# Patient Record
Sex: FEMALE | Race: BLACK OR AFRICAN AMERICAN | NOT HISPANIC OR LATINO | Employment: FULL TIME | URBAN - METROPOLITAN AREA
[De-identification: names, ages, dates, MRNs, and addresses within clinical notes are randomized per-mention and may not be internally consistent; named-entity substitution may affect disease eponyms.]

---

## 2019-01-17 ENCOUNTER — TELEPHONE (OUTPATIENT)
Dept: NEUROLOGY | Facility: CLINIC | Age: 44
End: 2019-01-17

## 2019-01-17 NOTE — TELEPHONE ENCOUNTER
Returned call to pt who was dx with MS in 2010. Pt scheduled to see Dr. Britton on 2/1/19 at 8AM. Advised pt to have office notes and mri reports faxed to our office prior to her appt, and to bring a copy of her most recent MRI disc to her appt as well. Provided fax number. Pt verbalized understanding.

## 2019-01-17 NOTE — TELEPHONE ENCOUNTER
----- Message from David Muñoz sent at 1/17/2019 11:29 AM CST -----  Pt seeking new neurologist for MS, due to previous provider no longer in practice. Pt requested Dr. Britton, did not see anything available. Pt placed on wait list for Dr. Britton. Pls contact pt if Dr. Valadez has an availability. Pt said her previous Neurologist diagnosed pt w/ MS in 2010. Pt can be reached at [phone].    Thank you,    David Muñoz

## 2019-02-01 ENCOUNTER — TELEPHONE (OUTPATIENT)
Dept: NEUROLOGY | Facility: CLINIC | Age: 44
End: 2019-02-01

## 2019-02-01 NOTE — TELEPHONE ENCOUNTER
----- Message from Angela Diaz RN sent at 2/1/2019  8:47 AM CST -----  Contact: Pt      ----- Message -----  From: Lionel Little  Sent: 1/31/2019   6:00 PM  To: [facility] Staff    Pt would like to be called back regarding rescheduling  with . No further information was given.    Pt can be reached at [phone].    Thank You.

## 2019-02-08 ENCOUNTER — OFFICE VISIT (OUTPATIENT)
Dept: NEUROLOGY | Facility: CLINIC | Age: 44
End: 2019-02-08
Payer: MEDICARE

## 2019-02-08 VITALS
HEIGHT: 66 IN | HEART RATE: 91 BPM | SYSTOLIC BLOOD PRESSURE: 136 MMHG | WEIGHT: 172 LBS | DIASTOLIC BLOOD PRESSURE: 89 MMHG | BODY MASS INDEX: 27.64 KG/M2

## 2019-02-08 DIAGNOSIS — G43.119 INTRACTABLE MIGRAINE WITH AURA WITHOUT STATUS MIGRAINOSUS: Primary | ICD-10-CM

## 2019-02-08 DIAGNOSIS — M79.7 FIBROMYALGIA: ICD-10-CM

## 2019-02-08 PROCEDURE — 99205 OFFICE O/P NEW HI 60 MIN: CPT | Mod: S$PBB,,, | Performed by: PSYCHIATRY & NEUROLOGY

## 2019-02-08 PROCEDURE — 99213 OFFICE O/P EST LOW 20 MIN: CPT | Mod: PBBFAC | Performed by: PSYCHIATRY & NEUROLOGY

## 2019-02-08 PROCEDURE — 99999 PR PBB SHADOW E&M-EST. PATIENT-LVL III: ICD-10-PCS | Mod: PBBFAC,,, | Performed by: PSYCHIATRY & NEUROLOGY

## 2019-02-08 PROCEDURE — 99205 PR OFFICE/OUTPT VISIT, NEW, LEVL V, 60-74 MIN: ICD-10-PCS | Mod: S$PBB,,, | Performed by: PSYCHIATRY & NEUROLOGY

## 2019-02-08 PROCEDURE — 99999 PR PBB SHADOW E&M-EST. PATIENT-LVL III: CPT | Mod: PBBFAC,,, | Performed by: PSYCHIATRY & NEUROLOGY

## 2019-02-08 RX ORDER — LIDOCAINE AND PRILOCAINE 25; 25 MG/G; MG/G
CREAM TOPICAL
COMMUNITY
Start: 2016-08-09 | End: 2021-01-21

## 2019-02-08 RX ORDER — MILNACIPRAN HYDROCHLORIDE 50 MG/1
TABLET, FILM COATED ORAL
COMMUNITY
Start: 2019-02-06 | End: 2020-12-23 | Stop reason: SDUPTHER

## 2019-02-08 RX ORDER — FLUTICASONE FUROATE AND VILANTEROL 100; 25 UG/1; UG/1
POWDER RESPIRATORY (INHALATION)
COMMUNITY
End: 2022-08-05

## 2019-02-08 RX ORDER — GABAPENTIN 600 MG/1
TABLET ORAL
COMMUNITY
Start: 2018-11-02 | End: 2020-12-23

## 2019-02-08 RX ORDER — HYDROCHLOROTHIAZIDE 25 MG/1
TABLET ORAL
COMMUNITY
Start: 2016-08-11 | End: 2023-06-08 | Stop reason: SDUPTHER

## 2019-02-08 RX ORDER — LACOSAMIDE 100 MG/1
TABLET ORAL
COMMUNITY
End: 2020-12-23

## 2019-02-08 RX ORDER — SUMATRIPTAN SUCCINATE 100 MG/1
TABLET ORAL
COMMUNITY
Start: 2016-08-26 | End: 2020-12-23

## 2019-02-08 RX ORDER — ALBUTEROL SULFATE 90 UG/1
AEROSOL, METERED RESPIRATORY (INHALATION)
COMMUNITY
Start: 2016-09-01

## 2019-02-08 RX ORDER — ACETAMINOPHEN AND CODEINE PHOSPHATE 300; 60 MG/1; MG/1
TABLET ORAL
COMMUNITY
End: 2020-12-23

## 2019-02-08 RX ORDER — AMLODIPINE BESYLATE 10 MG/1
TABLET ORAL
COMMUNITY
Start: 2015-06-25 | End: 2020-12-23

## 2019-02-08 RX ORDER — FLUTICASONE FUROATE AND VILANTEROL 100; 25 UG/1; UG/1
POWDER RESPIRATORY (INHALATION)
COMMUNITY
End: 2020-12-23

## 2019-02-08 RX ORDER — CHOLECALCIFEROL (VITAMIN D3) 25 MCG
1000 TABLET ORAL DAILY
COMMUNITY
End: 2020-12-23

## 2019-02-08 RX ORDER — BACLOFEN 10 MG/1
TABLET ORAL
COMMUNITY
Start: 2018-11-02 | End: 2020-12-23

## 2019-02-08 RX ORDER — HYDROCHLOROTHIAZIDE 25 MG/1
TABLET ORAL
COMMUNITY
Start: 2019-02-06 | End: 2020-12-23

## 2019-02-08 RX ORDER — ALFUZOSIN HYDROCHLORIDE 10 MG/1
10 TABLET, EXTENDED RELEASE ORAL
COMMUNITY
Start: 2017-12-22 | End: 2020-12-23

## 2019-02-08 RX ORDER — GABAPENTIN 600 MG/1
TABLET ORAL
COMMUNITY
End: 2020-12-23

## 2019-02-08 RX ORDER — BUPRENORPHINE 5 UG/H
1 PATCH TRANSDERMAL
COMMUNITY
Start: 2016-11-28 | End: 2020-12-23

## 2019-02-08 RX ORDER — BACLOFEN 10 MG/1
TABLET ORAL
COMMUNITY
Start: 2016-10-17 | End: 2022-08-05

## 2019-02-08 RX ORDER — AMLODIPINE BESYLATE 10 MG/1
TABLET ORAL
COMMUNITY
Start: 2019-02-02 | End: 2020-12-23

## 2019-02-08 NOTE — PROGRESS NOTES
"I saw Meka Cespedes as a new patient today for multiple sclerosis evaluation.  She comes in to establish care and is referred by Self, Aaareferral.  She is accompanied by no one.   OSH Records from Jayme Boyer and Dr. Tineo. Highlights documented here. Will be scanned into record later.      History of Present Illness  The patient is a 43 y.o. RH female with chronic fatigue, h/o fibromyalgia, HLD, HTN, asthma, and cervical radiculopathy, who presented 2/8/19 for MS evaluation      Diagnosed 2010 by Dr. Swain and Dr. Tineo. Symptoms - electrical shock from head to feet, then left side of face and body became numb. After numbness onset, then she had pain all over. She was admitted for a little over 2 weeks. She was told that she had cervical "radiculitis" and then told that she had MS prior to leaving. MRI report from that time (3/2010) states that there was a "sollitary 2-3mm [non-enhancing] white matter tract lesion in the right frontal lobe...No other lesions are demonstrated." She had to have significant rehabilitation - "had to learn to walk again". On lyrica, morphine, norco for pain. Not placed on any DMT's at the time. Placed on baclofen for spasms and gabapentin for neuropathic pain. Over time, she weaned herself from the scheduled pain meds. Then she was later seen by another neurologist who told her that she did not have MS. However, once she followed up with Dr. Tineo, he told her that she had CIS.  2014 - She had difficulty walking due to left leg weakness and left arm weakness. Rebif 2014, but she was not given refill  2016 - Told that she was having a relapse and was given IVMP for symptoms of burning sensation down back, ankles swelling, slurred speech, and joint pain. Also started having urinary hesitation and retention. She was seen by urology, started on flomax which she continued for a while then stopped as it was no longer needed.    Currently taking Vit D 1000 units " daily.    Patient also with uncontrolled migraines w/visual aura (blurred, low vision in right eye) + nausea +/- vomiting. Worse in first few days of menstrual cycle.       Review of systems:   Vertigo/Dizziness: yes  Headaches: Yes, see above  Visual Symptoms: no diplopia or eye pain, but eyesight worsening   Bladder Dysfunction: No   Bowel Dysfunction: constipation   Pain: Yes - back, neck, and joint pain. Has fibromyalgia pain.   Skin Breakdown: No open sores/rashes  Cognitive: No   Dysphagia: No   Dysarthria: No   Hand Dysfunction: No  Gait Disturbance: feels unsteady walking but no falls  Falls: No  Spasticity: No   Mood Disorder: Yes - anxiety, worry a lot    Fatigue: Yes - previously on weekly B12 injections   Sleep Disturbance: Yes - insomnia, daytime drowsiness, and restless sleep   Sexual Dysfunction: Not Assessed   Tremors: No       Past Medical History:   Diagnosis Date    Multiple sclerosis        Family History:   Mother -  2/2 sarcoid  Sister - lupus    Social History:  Employer: [employer]  Never smoker, no alcohol, no drugs  , 3 children      Review of patient's allergies indicates:   Allergen Reactions    Penicillins Anaphylaxis and Rash    Clindamycin Hives    Sulfamethoxazole-trimethoprim Hives, Itching and Swelling    Tizanidine        Current Outpatient Medications on File Prior to Visit   Medication Sig Dispense Refill Last Dose    acetaminophen-codeine 300-60mg (TYLENOL #4) 300-60 mg Tab acetaminophen 300 mg-codeine 60 mg tablet   Taking    albuterol (PROVENTIL/VENTOLIN HFA) 90 mcg/actuation inhaler Ventolin HFA 90 mcg/actuation aerosol inhaler   Taking    alfuzosin (UROXATRAL) 10 mg Tb24 Take 10 mg by mouth.   Taking    amLODIPine (NORVASC) 10 MG tablet amlodipine 10 mg tablet   Taking    amLODIPine (NORVASC) 10 MG tablet    Taking    baclofen (LIORESAL) 10 MG tablet baclofen 10 mg tablet   Taking    baclofen (LIORESAL) 10 MG tablet    Taking    buprenorphine 5 mcg/hour  "(BUTRANS) weekly patch Place 1 patch onto the skin.   Taking    fluticasone-vilanterol (BREO) 100-25 mcg/dose diskus inhaler Inhale into the lungs.   Taking    fluticasone-vilanterol (BREO) 100-25 mcg/dose diskus inhaler Breo Ellipta 100 mcg-25 mcg/dose powder for inhalation   Inhale 1 puff every day by inhalation route.   Taking    gabapentin (NEURONTIN) 600 MG tablet gabapentin 600 mg tablet   Take 0.5 tablets 3 times a day by oral route.   Taking    gabapentin (NEURONTIN) 600 MG tablet    Taking    hydroCHLOROthiazide (HYDRODIURIL) 25 MG tablet hydrochlorothiazide 25 mg tablet   Take 1 tablet every day by oral route.   Taking    hydroCHLOROthiazide (HYDRODIURIL) 25 MG tablet    Taking    HYDROCHLOROTHIAZIDE ORAL    Taking    lacosamide (VIMPAT) 100 mg Tab Vimpat 100 mg tablet   Taking    lidocaine-prilocaine (EMLA) cream    Taking    milnacipran (SAVELLA) 50 mg Tab Savella 50 mg tablet   TAKE 1 TABLET A DAY   Taking    SAVELLA 50 mg Tab    Taking    sumatriptan (IMITREX) 100 MG tablet TAKE 1/2 -1 TABLET ON ONSET OF MIGRANE, MAY REPEAT IN 2 HOURS IF NECESSARY. MAX OF 2 TABLETS PER 24 HOURS.   Taking    vitamin D (VITAMIN D3) 1000 units Tab Take 1,000 Units by mouth once daily.   Taking         Physical Exam    Vitals:    02/08/19 0914   Weight: 78 kg (172 lb)   Height: 5' 6" (1.676 m)       In general, the patient is well nourished.    MENTAL STATUS: language is fluent, normal verbal comprehension, memory is intact, attention is normal, patient is alert and oriented x 3, fund of knowlege is appropriate by vocabulary.     CRANIAL NERVE EXAM:  EOMI, PERRLA. There is no internuclear ophthalmoplegia. No facial asymmetry. There is no dysarthria. Uvula is midline, and palate moves symmetrically. Shoulder shrug intact bilaterlly. Tongue protrusion is midline. Hearing is grossly intact.    MOTOR EXAM: Normal bulk throughout. Paratonia in legs. No pronator drift. Strength is  5/5 in all groups in the lower " extremities and upper extremities;    REFLEXES: 2+ and symmetric throughout in all four extremeties; toes are down bilaterally    SENSORY EXAM: Normal to light touch throughout.    COORDINATION: No ataxia    GAIT: Narrow based and stable        IMAGING (personally reviewed):  MRI Brain and C-spine 2018: unremarkable MRI brain and cervical spinal cord, no visible demyelinating lesions. Some multilevel degenerative changes in cervical spine without apparent spinal stenosis.    LABS:  Lab Results   Component Value Date    WBC 11.95 (H) 05/24/2007    HGB 11.0 (L) 05/24/2007    HCT 33.2 (L) 05/24/2007    MCV 85.6 05/24/2007     05/24/2007         Chemistry        Component Value Date/Time     05/24/2007 1327    K 3.2 (L) 05/24/2007 1327     05/24/2007 1327    CO2 21 (L) 05/24/2007 1327    BUN 4 (L) 05/24/2007 1327    CREATININE 0.7 05/24/2007 1327    GLU 53 (L) 05/24/2007 1327        Component Value Date/Time    CALCIUM 9.9 05/24/2007 1327    ALKPHOS 120 05/24/2007 1327    AST 12 05/24/2007 1327    ALT 6 05/24/2007 1327    BILITOT 0.2 05/24/2007 1327            Lab Results   Component Value Date    LABPROT 9.6 05/10/2007    ALBUMIN 3.7 05/24/2007             Diagnosis/Assessment/Plan:       1. MS evaluation  · Assessment: after review of patient history, MRI, and prior records, we discussed that she does not likely have MS. Discussed with patient that her pain symptoms are likely related to her fibromyalgia. Also migraines or age or vascular risk factors could have contributed to the solitary white matter lesion that she had back in 2010. I do not have a plausible explanation for the event itself without further information/records from that time. Patient is attempting to locate her original scans and will have them mailed in to our clinic to see if any further explanation. Advised f/u with her PCP for her fibromyalgia and possibly seeing a rheumatologists to see if there are other options for pain  management.    2. Migraines with visual aura, intractable  - advised to keep a headache diary  - given counseling about proper migraine management, including keeping OTC and prescription pain medications to less than 2-3 times weekly to avoid rebound headaches. Encouraged hydration and rest for remainder of time  - can trial using ibuprofen 600mg bid 2-3 before menstrual cycle.  - will refer to headache clinic for further management      Over 50% of the 80 min appt was spent counseling the patient about diagnoses and symptom management    No f/u needed with me.    Gretchen Britton MD

## 2019-02-09 PROBLEM — M79.7 FIBROMYALGIA: Status: ACTIVE | Noted: 2019-02-09

## 2019-02-09 PROBLEM — J45.909 ASTHMA: Status: ACTIVE | Noted: 2019-02-09

## 2019-02-09 PROBLEM — Z98.51 H/O TUBAL LIGATION: Status: ACTIVE | Noted: 2019-02-09

## 2019-02-09 PROBLEM — G43.119 INTRACTABLE MIGRAINE WITH AURA WITHOUT STATUS MIGRAINOSUS: Status: ACTIVE | Noted: 2019-02-09

## 2019-02-11 ENCOUNTER — TELEPHONE (OUTPATIENT)
Dept: NEUROLOGY | Facility: CLINIC | Age: 44
End: 2019-02-11

## 2019-02-11 NOTE — TELEPHONE ENCOUNTER
----- Message from Jesse Kan sent at 2/11/2019 11:54 AM CST -----  Contact: Patient @ [phone]  Patient requesting a return call from Bina regarding the MRI imagining, pls call

## 2019-02-14 ENCOUNTER — DOCUMENTATION ONLY (OUTPATIENT)
Dept: NEUROLOGY | Facility: CLINIC | Age: 44
End: 2019-02-14

## 2019-02-26 ENCOUNTER — DOCUMENTATION ONLY (OUTPATIENT)
Dept: NEUROLOGY | Facility: CLINIC | Age: 44
End: 2019-02-26

## 2019-06-13 ENCOUNTER — TELEPHONE (OUTPATIENT)
Dept: RHEUMATOLOGY | Facility: CLINIC | Age: 44
End: 2019-06-13

## 2019-06-13 NOTE — TELEPHONE ENCOUNTER
Spoke with the pt and let her know that there was no available appointments on a Friday until October.

## 2019-07-11 ENCOUNTER — PATIENT MESSAGE (OUTPATIENT)
Dept: NEUROLOGY | Facility: CLINIC | Age: 44
End: 2019-07-11

## 2019-09-07 ENCOUNTER — PATIENT MESSAGE (OUTPATIENT)
Dept: NEUROLOGY | Facility: CLINIC | Age: 44
End: 2019-09-07

## 2020-12-18 NOTE — PROGRESS NOTES
"RHEUMATOLOGY CLINIC INITIAL VISIT    Reason for referral:-  Referred for evaluation of Fibromyalgia flare     Chief complaints:- "hurting all over and not feeling good"      HPI:-  Meka Quach a 45 y.o. pleasant female with PMH of fibromyalgia, Asthma, and migraines comes in for an initial visit with above chief complaints.     Patient was diagnosed with MS in  by Dr. Swain and Dr. Tineo.  Went to [facility] for evaluation (Dr. Britton) in 2019 - did not think it was MS (all MS medications were discontinued).  No follow up with neurology since.  Referral to rheumatology for evaluation of fibromyalgia.  Was on Savella and baclofen.      At the time of diagnosis of MS, patient was experiencing electrical shock from head to feet, L side of the face, and diffused body numbness with pain.  MRI brain (2010) - solitary 2-3mm non enhancing white matter tract lesion in the R frontal lobe.  Was on lyrica, morphine, and norco for pain.  Had to "learn to walk again".  Placed on baclofen for muscle spasm and gabapentin for neuropathic pain.  Then diagnosed with CIS and not MS.      Patient was doing well until Dec 14.  She woke up and was unable to lift her head off the pillow.  Patient started to experience burning sensation from the neck downward to the back.  Went to PCP - was diagnosed with fibromyalgia flare and provided with steroid (taper).  Steroid did not alleviate any of the symptoms.     Pain management in the past for joint injection - cervical and lumbar spine.  Had not went for a while   Fhx:  Mother with sarcoidosis.  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      Hx of clots - PE in 2017 (uncertain of the cause). Miscarriages - none  (full term with no complications)     ROS: Denies any rash, photosensitivity, unintentional weight loss, bowel symptoms, N/V, fever/chills, " Sicca symptoms, recent travels/sick contacts, depression, insomnia, hair loss    +mouth ulcers (in the past-  Nothing recent), Raynaud's, brain fogs, photosensitivity (body aches, fatigue, arthralgia - due to the heat.  Require cooling pack to go outside), severe dried eyes (scheduling appt with ophth), SOB (asthma and costochondritis), joint pain (cervical spine - shoulder, UE, LE, hips), urinary retention (following urologist), myalgia, inbsomnia      Review of Systems   Constitutional: Positive for malaise/fatigue. Negative for chills, diaphoresis, fever and weight loss.   HENT: Negative for congestion, ear discharge, ear pain, hearing loss, nosebleeds, sinus pain and tinnitus.    Eyes: Positive for blurred vision and pain. Negative for photophobia, discharge and redness.   Respiratory: Positive for shortness of breath. Negative for cough, hemoptysis, sputum production, wheezing and stridor.    Cardiovascular: Negative for chest pain, palpitations, orthopnea, claudication, leg swelling and PND.   Gastrointestinal: Negative for abdominal pain, constipation, diarrhea, heartburn, nausea and vomiting.   Genitourinary: Positive for urgency. Negative for dysuria, frequency and hematuria.   Musculoskeletal: Positive for back pain, joint pain, myalgias and neck pain.   Skin: Negative for rash.   Neurological: Positive for weakness and headaches. Negative for dizziness, tingling and tremors.   Endo/Heme/Allergies: Does not bruise/bleed easily.   Psychiatric/Behavioral: Negative for depression, hallucinations and suicidal ideas. The patient has insomnia. The patient is not nervous/anxious.        Past Medical History:   Diagnosis Date    Asthma 2/9/2019    Fibromyalgia 2/9/2019    H/O tubal ligation 2/9/2019    Multiple sclerosis        History reviewed. No pertinent surgical history.     Social History     Tobacco Use    Smoking status: Never Smoker    Smokeless tobacco: Never Used   Substance Use Topics    Alcohol  "use: Not on file    Drug use: Not on file       History reviewed. No pertinent family history.    Review of patient's allergies indicates:   Allergen Reactions    Penicillins Anaphylaxis and Rash    Clindamycin Hives    Sulfamethoxazole-trimethoprim Hives, Itching and Swelling    Tizanidine        Vitals:    12/23/20 1046   BP: (!) 149/91   Pulse: 105   Weight: 81.4 kg (179 lb 5.5 oz)   Height: 5' 6" (1.676 m)   PainSc: 10-Worst pain ever       Physical Exam   Constitutional: She is oriented to person, place, and time and well-developed, well-nourished, and in no distress.   HENT:   Head: Normocephalic and atraumatic.   No mouth ulcers  Normal salivary pooling   No signs of alopecia    Eyes: Pupils are equal, round, and reactive to light. Conjunctivae and EOM are normal.   Neck: Normal range of motion. Neck supple.   Increased sensitivity with touch in the nape of the neck to bilateral shoulder    Cardiovascular: Normal rate, regular rhythm and normal heart sounds.   Pulmonary/Chest: Effort normal and breath sounds normal.   Abdominal: Soft. Bowel sounds are normal.   Musculoskeletal: Normal range of motion.      Comments: 18/18 tender fibromyalgia points  Extreme sensitivity to light touch all over   No signs of synovitis     Neurological: She is alert and oriented to person, place, and time. Gait normal. GCS score is 15.   Skin: Skin is warm and dry. No rash noted. No erythema.   No rash    Psychiatric: Mood, memory, affect and judgment normal.   Tangential   Forgetful        Labs:-  Results for TIERRA SIERRA (MRN [id number]) as of 12/18/2020 14:00   Ref. Range 5/24/2007 15:13 5/24/2007 15:22 5/24/2007 15:23 5/24/2007 15:35 7/2/2007 12:28   Fetal Fibronectin Latest Ref Range: Negative  Negative       Chlamydia, Amplified DNA Latest Ref Range: Not detected    Not Detected     N gonorrhoeae, amplified DNA Latest Ref Range: Not detected    Not Detected     Color, UA Latest Ref Range: Yellow     STRAW  "   Appearance, UA Latest Ref Range: Clear     CLEAR    Specific [place], UA Latest Ref Range: 1.003 - 1.030     1.009    pH, UA Latest Ref Range: 4.5 - 8.0     7.5    Protein, UA Latest Ref Range: Negative mg/dl    NEG    Glucose, UA Latest Ref Range: Negative mg/dl    500 (A)    Ketones, UA Latest Ref Range: Negative mg/dl    NEG    Occult Blood UA Latest Ref Range: Negative     NEG    NITRITE UA Latest Ref Range: Negative     NEG    UROBILINOGEN UA Latest Ref Range: 0 - 1 EU/DL    0.2    Bilirubin (UA) Latest Ref Range: Negative     NEG    Leukocytes, UA Latest Ref Range: Negative     NEG    Bacteria, UA Latest Ref Range: None-Occ     RARE    Squam Epithel, UA Latest Units: /hpf    <1    CULTURE, PERIANAL, FOR GROUP A STREP Unknown  Rpt      CULTURE, URINE Unknown    Rpt    STREP B SCREEN, VAGINAL / RECTAL Unknown     Rpt     Radiographs:-  Independent visualization of images done.    Old and Outside medical records :-  Reviewed old and all outside medical records available in Care Everywhere.  May 2011 - negative MS panel    CSF - low protein (14). Glucose (63), Albumin 8.48. Oligoclonal bands - 0    ACE - 49   Lyme - negative   IgG - 1,135  June 2017 - CTA - L pulmonary artery suspicious for PE. nonobstructing L kidney stone   RUE - negative for DVT  Medication List with Changes/Refills   Current Medications    ALBUTEROL (PROVENTIL/VENTOLIN HFA) 90 MCG/ACTUATION INHALER    Ventolin HFA 90 mcg/actuation aerosol inhaler    BACLOFEN (LIORESAL) 10 MG TABLET    baclofen 10 mg tablet    FLUTICASONE-VILANTEROL (BREO) 100-25 MCG/DOSE DISKUS INHALER    Inhale into the lungs.    HYDROCHLOROTHIAZIDE (HYDRODIURIL) 25 MG TABLET    hydrochlorothiazide 25 mg tablet   Take 1 tablet every day by oral route.    LIDOCAINE-PRILOCAINE (EMLA) CREAM       Changed and/or Refilled Medications    Modified Medication Previous Medication    SAVELLA 50 MG TAB SAVELLA 50 mg Tab       Take 1 tablet (50 mg total) by mouth 2 (two) times daily.    "    Discontinued Medications    ACETAMINOPHEN-CODEINE 300-60MG (TYLENOL #4) 300-60 MG TAB    acetaminophen 300 mg-codeine 60 mg tablet    ALFUZOSIN (UROXATRAL) 10 MG TB24    Take 10 mg by mouth.    AMLODIPINE (NORVASC) 10 MG TABLET    amlodipine 10 mg tablet    AMLODIPINE (NORVASC) 10 MG TABLET        BACLOFEN (LIORESAL) 10 MG TABLET        BUPRENORPHINE 5 MCG/HOUR (BUTRANS) WEEKLY PATCH    Place 1 patch onto the skin.    FLUTICASONE-VILANTEROL (BREO) 100-25 MCG/DOSE DISKUS INHALER    Breo Ellipta 100 mcg-25 mcg/dose powder for inhalation   Inhale 1 puff every day by inhalation route.    GABAPENTIN (NEURONTIN) 600 MG TABLET    gabapentin 600 mg tablet   Take 0.5 tablets 3 times a day by oral route.    GABAPENTIN (NEURONTIN) 600 MG TABLET        HYDROCHLOROTHIAZIDE (HYDRODIURIL) 25 MG TABLET        HYDROCHLOROTHIAZIDE ORAL        LACOSAMIDE (VIMPAT) 100 MG TAB    Vimpat 100 mg tablet    MILNACIPRAN (SAVELLA) 50 MG TAB    Savella 50 mg tablet   TAKE 1 TABLET A DAY    SUMATRIPTAN (IMITREX) 100 MG TABLET    TAKE 1/2 -1 TABLET ON ONSET OF MIGRANE, MAY REPEAT IN 2 HOURS IF NECESSARY. MAX OF 2 TABLETS PER 24 HOURS.    VITAMIN D (VITAMIN D3) 1000 UNITS TAB    Take 1,000 Units by mouth once daily.       Assessment/Plans:-  45 y.o. pleasant female with PMH of fibromyalgia, Asthma, and migraines comes in for an initial visit with concern about fibromyalgia flare.     Patient was previously diagnosed with MS and then said it wasn't.  Patient had been off of MS medications for over 8 years.     Chronic shooting pain from the neck down to the back.  Diffused arthralgia - "hurting everywhere".      Patient has an episode of severe "shooting pain" from neck downward that started on Dec 14.  Since then, patient had not been feeling well.  Was prescribed steroid tapering - which did not help with symptoms.  Labs were done - unavailable to me at this time.  Was told inflammatory makers were elevated?    PE: suggestive of fibromyalgia. "  No signs of synovitis.    Diffused arthralgia - concern about fibromyalgia flare.  Patient had been having increased stressors recently.  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    Plan   - discontinue usage of steroid - no benefit and patient having insomnia  - recommendation to increase savella to 50mg BID   - recommendation for low intensity exercising daily   - de stress.  No work during the holidays (on the computer)  - due to fhx of autoimmune disease - will check SASHA, C3, C4, dsDNA, UA, Up/c, ESR, CRP  - CPK, aldolase  - ANCA, mpo, pr3  - TSH, fT4  - Vitamin B12 and folate  - Arthritis survey   - Vit D  - PreDMARDs panel   - obtain records from PCP - including recent notes, labs, and imaging  - education provided on the management of fibromyalgia       Time spent: 60 minutes in face to face discussion concerning diagnosis, prognosis, review of lab and test results, benefits of treatment as well as management of disease, counseling of patient and coordination of care between various health care providers.  Greater than half the time spent was used for coordination of care and counseling of patient.    Follow up in about 4 weeks (around 1/20/2021).    Thank you for allowing me to participate in the care ofMeka Cespedes.    Disclaimer: This note was prepared using voice recognition system and is likely to have sound alike errors and is not proof read.  Please call me with any questions.

## 2020-12-22 ENCOUNTER — TELEPHONE (OUTPATIENT)
Dept: RHEUMATOLOGY | Facility: CLINIC | Age: 45
End: 2020-12-22

## 2020-12-22 NOTE — TELEPHONE ENCOUNTER
----- Message from Shana Hand RN sent at 12/21/2020  5:11 PM CST -----  Regarding: FW: call back  Contact: [phone]  [facility]  ----- Message -----  From: Kassandra Almonte  Sent: 12/21/2020   4:18 PM CST  To: Rex Correa Staff  Subject: call back                                        Type:  Patient Call Back    Who Called:pt  What this is regarding?: make sure everything is prepared before appt   Would the patient rather a call back or a response via [facility]?call back  Best Call Back Number:[phone]  Additional Information:     Advised to call back directly if there are further questions, or if these symptoms fail to improve as anticipated or worsen.

## 2020-12-23 ENCOUNTER — OFFICE VISIT (OUTPATIENT)
Dept: RHEUMATOLOGY | Facility: CLINIC | Age: 45
End: 2020-12-23
Payer: MEDICARE

## 2020-12-23 ENCOUNTER — HOSPITAL ENCOUNTER (OUTPATIENT)
Dept: RADIOLOGY | Facility: HOSPITAL | Age: 45
Discharge: HOME | End: 2020-12-23
Attending: STUDENT IN AN ORGANIZED HEALTH CARE EDUCATION/TRAINING PROGRAM
Payer: MEDICARE

## 2020-12-23 VITALS
HEIGHT: 66 IN | WEIGHT: 179.38 LBS | HEART RATE: 105 BPM | BODY MASS INDEX: 28.83 KG/M2 | DIASTOLIC BLOOD PRESSURE: 91 MMHG | SYSTOLIC BLOOD PRESSURE: 149 MMHG

## 2020-12-23 DIAGNOSIS — M54.2 CERVICALGIA: ICD-10-CM

## 2020-12-23 DIAGNOSIS — M25.50 ARTHRALGIA, UNSPECIFIED JOINT: Primary | ICD-10-CM

## 2020-12-23 DIAGNOSIS — R20.3 HYPERESTHESIA: ICD-10-CM

## 2020-12-23 DIAGNOSIS — M79.7 FIBROMYALGIA: ICD-10-CM

## 2020-12-23 DIAGNOSIS — M25.50 ARTHRALGIA, UNSPECIFIED JOINT: ICD-10-CM

## 2020-12-23 DIAGNOSIS — G62.9 NEUROPATHY: ICD-10-CM

## 2020-12-23 PROCEDURE — 99205 OFFICE O/P NEW HI 60 MIN: CPT | Mod: S$GLB,,, | Performed by: STUDENT IN AN ORGANIZED HEALTH CARE EDUCATION/TRAINING PROGRAM

## 2020-12-23 PROCEDURE — 77077 JOINT SURVEY SINGLE VIEW: CPT | Mod: 26,,, | Performed by: RADIOLOGY

## 2020-12-23 PROCEDURE — 1125F PR PAIN SEVERITY QUANTIFIED, PAIN PRESENT: ICD-10-PCS | Mod: S$GLB,,, | Performed by: STUDENT IN AN ORGANIZED HEALTH CARE EDUCATION/TRAINING PROGRAM

## 2020-12-23 PROCEDURE — 77077 JOINT SURVEY SINGLE VIEW: CPT | Mod: TC

## 2020-12-23 PROCEDURE — 1125F AMNT PAIN NOTED PAIN PRSNT: CPT | Mod: S$GLB,,, | Performed by: STUDENT IN AN ORGANIZED HEALTH CARE EDUCATION/TRAINING PROGRAM

## 2020-12-23 PROCEDURE — 99999 PR PBB SHADOW E&M-EST. PATIENT-LVL III: ICD-10-PCS | Mod: PBBFAC,,, | Performed by: STUDENT IN AN ORGANIZED HEALTH CARE EDUCATION/TRAINING PROGRAM

## 2020-12-23 PROCEDURE — 77077 XR ARTHRITIS SURVEY: ICD-10-PCS | Mod: 26,,, | Performed by: RADIOLOGY

## 2020-12-23 PROCEDURE — 99999 PR PBB SHADOW E&M-EST. PATIENT-LVL III: CPT | Mod: PBBFAC,,, | Performed by: STUDENT IN AN ORGANIZED HEALTH CARE EDUCATION/TRAINING PROGRAM

## 2020-12-23 PROCEDURE — 3008F BODY MASS INDEX DOCD: CPT | Mod: CPTII,S$GLB,, | Performed by: STUDENT IN AN ORGANIZED HEALTH CARE EDUCATION/TRAINING PROGRAM

## 2020-12-23 PROCEDURE — 3008F PR BODY MASS INDEX (BMI) DOCUMENTED: ICD-10-PCS | Mod: CPTII,S$GLB,, | Performed by: STUDENT IN AN ORGANIZED HEALTH CARE EDUCATION/TRAINING PROGRAM

## 2020-12-23 PROCEDURE — 99205 PR OFFICE/OUTPT VISIT, NEW, LEVL V, 60-74 MIN: ICD-10-PCS | Mod: S$GLB,,, | Performed by: STUDENT IN AN ORGANIZED HEALTH CARE EDUCATION/TRAINING PROGRAM

## 2020-12-23 RX ORDER — MILNACIPRAN HYDROCHLORIDE 50 MG/1
50 TABLET, FILM COATED ORAL 2 TIMES DAILY
Qty: 60 TABLET | Refills: 0 | Status: SHIPPED | OUTPATIENT
Start: 2020-12-23 | End: 2021-01-21 | Stop reason: SDUPTHER

## 2020-12-23 NOTE — TELEPHONE ENCOUNTER
----- Message from Dunia Lewis sent at 2020 10:17 AM CST -----  Regardin mins late  Type: Needs Medical Advice  Who Called:  pt  Symptoms (please be specific):    How long has patient had these symptoms:    Pharmacy name and phone #:    Best Call Back Number: [phone] (home)     Additional Information: pt 20 late for appt thanks

## 2020-12-24 ENCOUNTER — TELEPHONE (OUTPATIENT)
Dept: RHEUMATOLOGY | Facility: CLINIC | Age: 45
End: 2020-12-24

## 2020-12-24 DIAGNOSIS — E55.9 VITAMIN D INSUFFICIENCY: Primary | ICD-10-CM

## 2020-12-24 RX ORDER — ERGOCALCIFEROL 1.25 MG/1
50000 CAPSULE ORAL
Qty: 4 CAPSULE | Refills: 11 | Status: SHIPPED | OUTPATIENT
Start: 2020-12-24 | End: 2021-01-21

## 2021-01-21 ENCOUNTER — OFFICE VISIT (OUTPATIENT)
Dept: RHEUMATOLOGY | Facility: CLINIC | Age: 46
End: 2021-01-21
Payer: MEDICARE

## 2021-01-21 VITALS
HEART RATE: 81 BPM | DIASTOLIC BLOOD PRESSURE: 87 MMHG | WEIGHT: 181.19 LBS | BODY MASS INDEX: 29.12 KG/M2 | SYSTOLIC BLOOD PRESSURE: 133 MMHG | HEIGHT: 66 IN

## 2021-01-21 DIAGNOSIS — M25.50 ARTHRALGIA, UNSPECIFIED JOINT: ICD-10-CM

## 2021-01-21 DIAGNOSIS — M79.7 FIBROMYALGIA: ICD-10-CM

## 2021-01-21 DIAGNOSIS — E55.9 VITAMIN D INSUFFICIENCY: Primary | ICD-10-CM

## 2021-01-21 DIAGNOSIS — J45.909 ASTHMA, UNSPECIFIED ASTHMA SEVERITY, UNSPECIFIED WHETHER COMPLICATED, UNSPECIFIED WHETHER PERSISTENT: ICD-10-CM

## 2021-01-21 PROCEDURE — 99214 OFFICE O/P EST MOD 30 MIN: CPT | Mod: S$GLB,,, | Performed by: STUDENT IN AN ORGANIZED HEALTH CARE EDUCATION/TRAINING PROGRAM

## 2021-01-21 PROCEDURE — 3008F BODY MASS INDEX DOCD: CPT | Mod: CPTII,S$GLB,, | Performed by: STUDENT IN AN ORGANIZED HEALTH CARE EDUCATION/TRAINING PROGRAM

## 2021-01-21 PROCEDURE — 99214 PR OFFICE/OUTPT VISIT, EST, LEVL IV, 30-39 MIN: ICD-10-PCS | Mod: S$GLB,,, | Performed by: STUDENT IN AN ORGANIZED HEALTH CARE EDUCATION/TRAINING PROGRAM

## 2021-01-21 PROCEDURE — 1125F PR PAIN SEVERITY QUANTIFIED, PAIN PRESENT: ICD-10-PCS | Mod: S$GLB,,, | Performed by: STUDENT IN AN ORGANIZED HEALTH CARE EDUCATION/TRAINING PROGRAM

## 2021-01-21 PROCEDURE — 1125F AMNT PAIN NOTED PAIN PRSNT: CPT | Mod: S$GLB,,, | Performed by: STUDENT IN AN ORGANIZED HEALTH CARE EDUCATION/TRAINING PROGRAM

## 2021-01-21 PROCEDURE — 99999 PR PBB SHADOW E&M-EST. PATIENT-LVL III: CPT | Mod: PBBFAC,,, | Performed by: STUDENT IN AN ORGANIZED HEALTH CARE EDUCATION/TRAINING PROGRAM

## 2021-01-21 PROCEDURE — 3008F PR BODY MASS INDEX (BMI) DOCUMENTED: ICD-10-PCS | Mod: CPTII,S$GLB,, | Performed by: STUDENT IN AN ORGANIZED HEALTH CARE EDUCATION/TRAINING PROGRAM

## 2021-01-21 PROCEDURE — 99999 PR PBB SHADOW E&M-EST. PATIENT-LVL III: ICD-10-PCS | Mod: PBBFAC,,, | Performed by: STUDENT IN AN ORGANIZED HEALTH CARE EDUCATION/TRAINING PROGRAM

## 2021-01-21 RX ORDER — CARVEDILOL 12.5 MG/1
TABLET ORAL
COMMUNITY
Start: 2020-09-21

## 2021-01-21 RX ORDER — MILNACIPRAN HYDROCHLORIDE 50 MG/1
50 TABLET, FILM COATED ORAL 2 TIMES DAILY
Qty: 60 TABLET | Refills: 2 | Status: SHIPPED | OUTPATIENT
Start: 2021-01-21 | End: 2021-03-18 | Stop reason: SDUPTHER

## 2021-01-21 RX ORDER — PREDNISONE 10 MG/1
TABLET ORAL
COMMUNITY
Start: 2021-01-13 | End: 2021-01-21

## 2021-02-05 ENCOUNTER — TELEPHONE (OUTPATIENT)
Dept: RHEUMATOLOGY | Facility: CLINIC | Age: 46
End: 2021-02-05

## 2021-02-09 ENCOUNTER — PATIENT MESSAGE (OUTPATIENT)
Dept: RHEUMATOLOGY | Facility: CLINIC | Age: 46
End: 2021-02-09

## 2021-02-23 ENCOUNTER — PATIENT MESSAGE (OUTPATIENT)
Dept: RHEUMATOLOGY | Facility: CLINIC | Age: 46
End: 2021-02-23

## 2021-03-12 ENCOUNTER — PATIENT MESSAGE (OUTPATIENT)
Dept: RHEUMATOLOGY | Facility: CLINIC | Age: 46
End: 2021-03-12

## 2021-03-16 ENCOUNTER — TELEPHONE (OUTPATIENT)
Dept: RHEUMATOLOGY | Facility: CLINIC | Age: 46
End: 2021-03-16

## 2021-03-18 ENCOUNTER — OFFICE VISIT (OUTPATIENT)
Dept: RHEUMATOLOGY | Facility: CLINIC | Age: 46
End: 2021-03-18
Payer: MEDICARE

## 2021-03-18 VITALS
SYSTOLIC BLOOD PRESSURE: 129 MMHG | HEIGHT: 66 IN | DIASTOLIC BLOOD PRESSURE: 83 MMHG | WEIGHT: 189.25 LBS | BODY MASS INDEX: 30.41 KG/M2 | HEART RATE: 82 BPM

## 2021-03-18 DIAGNOSIS — M25.50 ARTHRALGIA, UNSPECIFIED JOINT: ICD-10-CM

## 2021-03-18 DIAGNOSIS — M79.7 FIBROMYALGIA: ICD-10-CM

## 2021-03-18 DIAGNOSIS — E55.9 VITAMIN D INSUFFICIENCY: Primary | ICD-10-CM

## 2021-03-18 PROCEDURE — 99214 OFFICE O/P EST MOD 30 MIN: CPT | Mod: S$GLB,,, | Performed by: STUDENT IN AN ORGANIZED HEALTH CARE EDUCATION/TRAINING PROGRAM

## 2021-03-18 PROCEDURE — 1126F AMNT PAIN NOTED NONE PRSNT: CPT | Mod: S$GLB,,, | Performed by: STUDENT IN AN ORGANIZED HEALTH CARE EDUCATION/TRAINING PROGRAM

## 2021-03-18 PROCEDURE — 3008F PR BODY MASS INDEX (BMI) DOCUMENTED: ICD-10-PCS | Mod: CPTII,S$GLB,, | Performed by: STUDENT IN AN ORGANIZED HEALTH CARE EDUCATION/TRAINING PROGRAM

## 2021-03-18 PROCEDURE — 99999 PR PBB SHADOW E&M-EST. PATIENT-LVL III: ICD-10-PCS | Mod: PBBFAC,,, | Performed by: STUDENT IN AN ORGANIZED HEALTH CARE EDUCATION/TRAINING PROGRAM

## 2021-03-18 PROCEDURE — 1126F PR PAIN SEVERITY QUANTIFIED, NO PAIN PRESENT: ICD-10-PCS | Mod: S$GLB,,, | Performed by: STUDENT IN AN ORGANIZED HEALTH CARE EDUCATION/TRAINING PROGRAM

## 2021-03-18 PROCEDURE — 3008F BODY MASS INDEX DOCD: CPT | Mod: CPTII,S$GLB,, | Performed by: STUDENT IN AN ORGANIZED HEALTH CARE EDUCATION/TRAINING PROGRAM

## 2021-03-18 PROCEDURE — 99214 PR OFFICE/OUTPT VISIT, EST, LEVL IV, 30-39 MIN: ICD-10-PCS | Mod: S$GLB,,, | Performed by: STUDENT IN AN ORGANIZED HEALTH CARE EDUCATION/TRAINING PROGRAM

## 2021-03-18 PROCEDURE — 99999 PR PBB SHADOW E&M-EST. PATIENT-LVL III: CPT | Mod: PBBFAC,,, | Performed by: STUDENT IN AN ORGANIZED HEALTH CARE EDUCATION/TRAINING PROGRAM

## 2021-03-18 RX ORDER — MILNACIPRAN HYDROCHLORIDE 50 MG/1
50 TABLET, FILM COATED ORAL 2 TIMES DAILY
Qty: 60 TABLET | Refills: 2 | Status: SHIPPED | OUTPATIENT
Start: 2021-03-18 | End: 2021-10-27

## 2021-03-18 ASSESSMENT — ROUTINE ASSESSMENT OF PATIENT INDEX DATA (RAPID3)
AM STIFFNESS SCORE: 1, YES
PAIN SCORE: 3.5
PSYCHOLOGICAL DISTRESS SCORE: 1.1
PATIENT GLOBAL ASSESSMENT SCORE: 0
MDHAQ FUNCTION SCORE: 0.4
TOTAL RAPID3 SCORE: 1.61
FATIGUE SCORE: 0

## 2021-05-12 ENCOUNTER — PATIENT MESSAGE (OUTPATIENT)
Dept: RESEARCH | Facility: HOSPITAL | Age: 46
End: 2021-05-12

## 2021-09-29 ENCOUNTER — PATIENT MESSAGE (OUTPATIENT)
Dept: RHEUMATOLOGY | Facility: CLINIC | Age: 46
End: 2021-09-29

## 2021-09-29 ENCOUNTER — TELEPHONE (OUTPATIENT)
Dept: RHEUMATOLOGY | Facility: CLINIC | Age: 46
End: 2021-09-29

## 2021-09-29 RX ORDER — ERGOCALCIFEROL 1.25 MG/1
50000 CAPSULE ORAL
Qty: 4 CAPSULE | Refills: 11 | Status: SHIPPED | OUTPATIENT
Start: 2021-09-29 | End: 2021-10-01

## 2021-12-21 ENCOUNTER — PATIENT MESSAGE (OUTPATIENT)
Dept: NEUROLOGY | Facility: CLINIC | Age: 46
End: 2021-12-21
Payer: MEDICARE

## 2022-02-24 ENCOUNTER — OFFICE VISIT (OUTPATIENT)
Dept: RHEUMATOLOGY | Facility: CLINIC | Age: 47
End: 2022-02-24
Payer: COMMERCIAL

## 2022-02-24 DIAGNOSIS — M79.7 FIBROMYALGIA: Primary | ICD-10-CM

## 2022-02-24 DIAGNOSIS — R20.2 ARM PARESTHESIA, LEFT: ICD-10-CM

## 2022-02-24 DIAGNOSIS — M48.9 CERVICAL SPINE DISEASE: ICD-10-CM

## 2022-02-24 PROCEDURE — 1159F MED LIST DOCD IN RCRD: CPT | Mod: CPTII,95,, | Performed by: INTERNAL MEDICINE

## 2022-02-24 PROCEDURE — 99214 OFFICE O/P EST MOD 30 MIN: CPT | Mod: GT,,, | Performed by: INTERNAL MEDICINE

## 2022-02-24 PROCEDURE — 99214 PR OFFICE/OUTPT VISIT, EST, LEVL IV, 30-39 MIN: ICD-10-PCS | Mod: GT,,, | Performed by: INTERNAL MEDICINE

## 2022-02-24 PROCEDURE — 1159F PR MEDICATION LIST DOCUMENTED IN MEDICAL RECORD: ICD-10-PCS | Mod: CPTII,95,, | Performed by: INTERNAL MEDICINE

## 2022-02-24 RX ORDER — GABAPENTIN 100 MG/1
100 CAPSULE ORAL 3 TIMES DAILY
Qty: 90 CAPSULE | Refills: 3 | Status: SHIPPED | OUTPATIENT
Start: 2022-02-24 | End: 2022-08-05

## 2022-02-24 RX ORDER — ERGOCALCIFEROL 1.25 MG/1
CAPSULE ORAL
COMMUNITY
End: 2022-08-05

## 2022-02-24 RX ORDER — METHYLPREDNISOLONE 4 MG/1
TABLET ORAL
Qty: 1 EACH | Refills: 0 | Status: SHIPPED | OUTPATIENT
Start: 2022-02-24 | End: 2022-08-05

## 2022-02-24 NOTE — PROGRESS NOTES
"RHEUMATOLOGY CLINIC FOLLOW UP VISIT      Chief complaints:- joint pain     The patient location is:home  The chief complaint leading to consultation is: fibromyalgia    Visit type: audiovisual    Face to Face time with patient: 30  minutes of total time spent on the encounter, which includes face to face time and non-face to face time preparing to see the patient (eg, review of tests), Obtaining and/or reviewing separately obtained history, Documenting clinical information in the electronic or other health record, Independently interpreting results (not separately reported) and communicating results to the patient/family/caregiver, or Care coordination (not separately reported).         Each patient to whom he or she provides medical services by telemedicine is:  (1) informed of the relationship between the physician and patient and the respective role of any other health care provider with respect to management of the patient; and (2) notified that he or she may decline to receive medical services by telemedicine and may withdraw from such care at any time.    Notes:       HPI:-    Meka Quach a 46 y.o. pleasant female with PMH of fibromyalgia, Asthma, and migraines comes in for an initial visit with above chief complaints.     Patient was diagnosed with MS in 2010 by Dr. Swain and Dr. Tineo.  Went to [facility] for evaluation (Dr. Britton) in 2019 - did not think it was MS (all MS medications were discontinued).  No follow up with neurology since.  Referral to rheumatology for evaluation of fibromyalgia.  Was on Savella and baclofen.      At the time of diagnosis of MS, patient was experiencing electrical shock from head to feet, L side of the face, and diffused body numbness with pain.  MRI brain (March 2010) - solitary 2-3mm non enhancing white matter tract lesion in the R frontal lobe.  Was on lyrica, morphine, and norco for pain.  Had to "learn to walk again".  Placed on baclofen for muscle " spasm and gabapentin for neuropathic pain.  Then diagnosed with CIS and not MS.      Patient was doing well until Dec 14.  She woke up and was unable to lift her head off the pillow.  Patient started to experience burning sensation from the neck downward to the back.  Went to PCP - was diagnosed with fibromyalgia flare and provided with steroid (taper).  Steroid did not alleviate any of the symptoms.     Pain management in the past for joint injection - cervical and lumbar spine.  Had not went for a while   Fhx:  Mother with sarcoidosis.  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      Hx of clots - PE in 2017 (uncertain of the cause). Miscarriages - none  (full term with no complications)     ROS: Denies any rash, photosensitivity, unintentional weight loss, bowel symptoms, N/V, fever/chills, Sicca symptoms, recent travels/sick contacts, depression, insomnia, hair loss    +mouth ulcers (in the past-  Nothing recent), Raynaud's, brain fogs, photosensitivity (body aches, fatigue, arthralgia - due to the heat.  Require cooling pack to go outside), severe dried eyes (scheduling appt with ophth), SOB (asthma and costochondritis), joint pain (cervical spine - shoulder, UE, LE, hips), urinary retention (following urologist), myalgia, inbsomnia    Interval history 3/2021    No rash      Patient is doing much better with Sevalla 50mg BID.  Tolerating medication without complications.  Doing daily stretching exercising at home.  Had been mediating and trying to walk around the neighborhood.      Had not started to go back to work yet - was supposed to go back in Feb but didn't want to do it yet.  Currently looking for a full time position.    2022    Neck and left shoulder hurts  Whole body also hurts  Fatigue is severe     She used to go to physical therapy in the past [id number]  She now sees chiropractor    On  savella 50 mg bid  She used to be on gabapentin and she has left arm paresthesias which goes down to the left arm and fingers    Review of Systems   Constitutional: Negative for chills, diaphoresis, fever, malaise/fatigue and weight loss.   HENT: Negative for congestion, ear discharge, ear pain, hearing loss, nosebleeds, sinus pain and tinnitus.    Eyes: Negative for blurred vision, photophobia, pain, discharge and redness.   Respiratory: Negative for cough, hemoptysis, sputum production, shortness of breath, wheezing and stridor.    Cardiovascular: Negative for chest pain, palpitations, orthopnea, claudication, leg swelling and PND.   Gastrointestinal: Negative for abdominal pain, constipation, diarrhea, heartburn, nausea and vomiting.   Genitourinary: Negative for dysuria, frequency, hematuria and urgency.   Musculoskeletal: Negative for back pain, joint pain, myalgias and neck pain.   Skin: Negative for rash.   Neurological: Negative for dizziness, tingling, tremors, weakness and headaches.   Endo/Heme/Allergies: Does not bruise/bleed easily.   Psychiatric/Behavioral: Negative for depression, hallucinations and suicidal ideas. The patient is not nervous/anxious and does not have insomnia.        Past Medical History:   Diagnosis Date    Asthma 2/9/2019    Fibromyalgia 2/9/2019    H/O tubal ligation 2/9/2019    Multiple sclerosis        No past surgical history on file.     Social History     Tobacco Use    Smoking status: Never Smoker    Smokeless tobacco: Never Used       No family history on file.    Review of patient's allergies indicates:   Allergen Reactions    Penicillins Anaphylaxis and Rash    Clindamycin Hives    Sulfamethoxazole-trimethoprim Hives, Itching and Swelling    Tizanidine        Vitals:    02/24/22 0756   PainSc: 10-Worst pain ever   PainLoc: Generalized       Physical Exam  HENT:      Head: Normocephalic and atraumatic.   Eyes:      Conjunctiva/sclera: Conjunctivae normal.       Pupils: Pupils are equal, round, and reactive to light.   Cardiovascular:      Rate and Rhythm: Normal rate and regular rhythm.      Heart sounds: Normal heart sounds.   Pulmonary:      Effort: Pulmonary effort is normal.      Breath sounds: Normal breath sounds.   Abdominal:      General: Bowel sounds are normal.      Palpations: Abdomen is soft.   Musculoskeletal:         General: Normal range of motion.      Cervical back: Normal range of motion and neck supple.      Comments: No signs of synovitis   ROM intact       Skin:     General: Skin is warm and dry.      Findings: No erythema or rash.      Comments: No rash    Neurological:      Mental Status: She is alert and oriented to person, place, and time.      Gait: Gait is intact.   Psychiatric:         Mood and Affect: Mood and affect normal.         Cognition and Memory: Memory normal.         Judgment: Judgment normal.         Labs:-  Results for TIERRA SIERRA (MRN [id number]) as of 3/9/2021 12:56   Ref. Range 3/12/2019 13:32 3/12/2019 13:36 12/23/2020 11:58 12/23/2020 12:29 12/23/2020 12:42   WBC Latest Ref Range: 3.9 - 12.7 K/uL    8.64    RBC Latest Ref Range: 4.00 - 5.40 M/uL    4.75    Hemoglobin Latest Ref Range: 12.0 - 16.0 g/dL    12.7    Hematocrit Latest Ref Range: 37 - 48 %    40.4    MCV Latest Ref Range: 82.0 - 98.0 fL    85    MCH Latest Ref Range: 27.0 - 31.0 pg    26.7 (L)    MCHC Latest Ref Range: 32 - 36 g/dL    31.4 (L)    RDW Latest Ref Range: 11.5 - 14.5 %    15.5 (H)    Platelets Latest Ref Range: 150 - 350 K/uL    389 (H)    MPV Latest Ref Range: 9.2 - 12.9 fL    10.0    Gran % Latest Ref Range: 38.0 - 73.0 %    61.9    Lymph % Latest Ref Range: 18 - 48 %    25.6    Mono % Latest Ref Range: 4 - 15 %    10.6    Eosinophil % Latest Ref Range: 0.0 - 8.0 %    0.9    Basophil % Latest Ref Range: 0 - 1 %    0.7    Immature Granulocytes Latest Ref Range: 0.0 - 0.5 %    0.3    Gran # (ANC) Latest Ref Range: 1.8 - 7.7 K/uL    5.3    Lymph #  Latest Ref Range: 1.0 - 4.8 K/uL    2.2    Mono # Latest Ref Range: 0.3 - 1.0 K/uL    0.9    Eos # Latest Ref Range: 0.0 - 0.5 K/uL    0.1    Baso # Latest Ref Range: 0.00 - 0.20 K/uL    0.06    Immature Grans (Abs) Latest Ref Range: 0.00 - 0.04 K/uL    0.03    nRBC Latest Ref Range: 0 /100 WBC    0    Differential Method Unknown    Automated    Folate Latest Ref Range: 4.0 - 24.0 ng/mL    13.5    Vitamin B-12 Latest Ref Range: 210 - 950 pg/mL    709    Sed Rate Latest Ref Range: 0 - 20 mm/Hr    35 (H)    Sodium Latest Ref Range: 136 - 145 mmol/L    138    Potassium Latest Ref Range: 3.5 - 5.1 mmol/L    3.4 (L)    Chloride Latest Ref Range: 95 - 110 mmol/L    100    CO2 Latest Ref Range: 23 - 29 mmol/L    29    Anion Gap Latest Ref Range: 8 - 16 mmol/L    9    BUN Latest Ref Range: 6 - 20 mg/dL    14    Creatinine Latest Ref Range: 0.5 - 1.4 mg/dL    1.0    eGFR if non African American Latest Ref Range: >60 mL/min/1.73 m^2    >60    eGFR if  Latest Ref Range: >60 mL/min/1.73 m^2    >60    Glucose Latest Ref Range: 70 - 110 mg/dL    96    Calcium Latest Ref Range: 8.7 - 10.5 mg/dL    9.7    Alkaline Phosphatase Latest Ref Range: 55 - 135 U/L    88    PROTEIN TOTAL Latest Ref Range: 6.0 - 8.4 g/dL    8.6 (H)    Albumin Latest Ref Range: 3.5 - 5.2 g/dL    4.0    BILIRUBIN TOTAL Latest Ref Range: 0.1 - 1.0 mg/dL    0.2    AST Latest Ref Range: 10 - 40 U/L    13    ALT Latest Ref Range: 10 - 44 U/L    9 (L)    CRP Latest Ref Range: 0.0 - 8.2 mg/L    4.0    CPK Latest Ref Range: 20 - 180 U/L    58    Vit D, 25-Hydroxy Latest Ref Range: 30 - 96 ng/mL    8 (L)    TSH Latest Ref Range: 0.40 - 4.00 uIU/mL    1.705    Free T4 Latest Ref Range: 0.71 - 1.51 ng/dL    1.02    SASHA Screen Latest Ref Range: Negative <1:80     Negative <1:80    ds DNA Ab Latest Ref Range: Negative 1:10     Negative 1:10    Cytoplasmic Neutrophilic Ab Latest Ref Range: <1:20 Titer    <1:20    Perinuclear (P-ANCA) Latest Ref Range: <1:20  Titer    <1:20    Complement (C-3) Latest Ref Range: 50 - 180 mg/dL    153    Complement (C-4) Latest Ref Range: 11 - 44 mg/dL    47 (H)    CCP Antibodies Latest Ref Range: <5.0 U/mL    0.8    Rheumatoid Factor Latest Ref Range: 0.0 - 15.0 IU/mL    <10.0    Hepatitis A Antibody IgG Unknown    Negative    Hep B Core Total Ab Unknown    Negative    Hep B S Ab Unknown    Negative    Hepatitis B Surface Ag Latest Ref Range: Negative     Negative    Hepatitis C Ab Latest Ref Range: Negative     Negative    Mitogen - Nil Latest Ref Range: See text IU/mL    9.330    NIL Latest Ref Range: See text IU/mL    0.010    TB Gold Plus Unknown    Negative    TB1 - Nil Latest Ref Range: See text IU/mL    0.010    TB2 - Nil Latest Ref Range: See text IU/mL    0.010    HIV 1/2 Ag/Ab Latest Ref Range: Negative     Negative    RPR Latest Ref Range: Non-reactive     Non-reactive    Strongyloides Ab IgG Latest Ref Range: Negative     Negative    Varicella IgG Latest Ref Range: 0.00 - 0.90 ISR    3.36 (H)    Varicella Interpretation Latest Ref Range: Negative     Positive (A)    Specimen UA Unknown   Urine, Clean Catch     Color, UA Latest Ref Range: Yellow, Straw, Portia    Yellow     Appearance, UA Latest Ref Range: Clear    Clear     Specific [place], UA Latest Ref Range: 1.005 - 1.030    1.025     pH, UA Latest Ref Range: 5.0 - 8.0    6.0     Protein, UA Latest Ref Range: Negative    Negative     Glucose, UA Latest Ref Range: Negative    Negative     Ketones, UA Latest Ref Range: Negative    Negative     Occult Blood UA Latest Ref Range: Negative    Negative     NITRITE UA Latest Ref Range: Negative    Negative     UROBILINOGEN UA Latest Ref Range: <2.0 EU/dL   Negative     Bilirubin (UA) Latest Ref Range: Negative    Negative     Leukocytes, UA Latest Ref Range: Negative    Negative     Creatinine, Urine Latest Ref Range: 15.0 - 325.0 mg/dL   194.7     Protein, Urine Random Latest Ref Range: 0 - 15 mg/dL   12     Prot/Creat Ratio,  "Urine Latest Ref Range: 0.00 - 0.20    0.06     XR ARTHRITIS SURVEY Unknown     Rpt   MRI PREVIOUS Unknown Rpt Rpt      Aldolase Latest Ref Range: 1.2 - 7.6 U/L    4.2          Radiographs:-  Independent visualization of images done.  Dec 2020 - arthritis survey - normal     Old and Outside medical records :-  Reviewed old and all outside medical records available in Care Everywhere.  May 2011 - negative MS panel    CSF - low protein (14). Glucose (63), Albumin 8.48. Oligoclonal bands - 0    ACE - 49   Lyme - negative   IgG - 1,135  June 2017 - CTA - L pulmonary artery suspicious for PE. nonobstructing L kidney stone   RUE - negative for DVT  Medication List with Changes/Refills   New Medications    GABAPENTIN (NEURONTIN) 100 MG CAPSULE    Take 1 capsule (100 mg total) by mouth 3 (three) times daily.    METHYLPREDNISOLONE (MEDROL DOSEPACK) 4 MG TABLET    use as directed    MILNACIPRAN (SAVELLA) 50 MG TAB    Take 1 tablet (50 mg total) by mouth 2 (two) times daily.   Current Medications    ALBUTEROL (PROVENTIL/VENTOLIN HFA) 90 MCG/ACTUATION INHALER    Ventolin HFA 90 mcg/actuation aerosol inhaler    BACLOFEN (LIORESAL) 10 MG TABLET    As needed    CARVEDILOL (COREG) 12.5 MG TABLET    Twice daily    ERGOCALCIFEROL (ERGOCALCIFEROL) 50,000 UNIT CAP    ergocalciferol (vitamin D2) 1,250 mcg (50,000 unit) capsule    FLUTICASONE-VILANTEROL (BREO) 100-25 MCG/DOSE DISKUS INHALER    Inhale into the lungs.    HYDROCHLOROTHIAZIDE (HYDRODIURIL) 25 MG TABLET    Once daily   Discontinued Medications    SAVELLA 50 MG TAB    TAKE 1 TABLET EVERY DAY       Assessment/Plans:-    46 y.o. pleasant female with PMH of fibromyalgia, Asthma, and migraines comes in for an initial visit with concern about fibromyalgia flare.     Patient was previously diagnosed with MS and then said it wasn't.  Patient had been off of MS medications for over 8 years.     Chronic shooting pain from the neck down to the back.  Diffused arthralgia - "hurting " "everywhere".      Patient has an episode of severe "shooting pain" from neck downward that started on Dec 14.  Since then, patient had not been feeling well.  Was prescribed steroid tapering - which did not help with symptoms.  Labs were done - unavailable to me at this time.  Was told inflammatory makers were elevated?    PE: suggestive of fibromyalgia.  No signs of synovitis.    Labs: all normal.  Normal inflammatory.  Rheumatological workup - negative.     Fibromyalgia - controlled with savella 50mg BID       2/2022    Neck and left shoulder hurts  Whole body also hurts  Fatigue is severe     She used to go to physical therapy in the past [id number]  She now sees chiropractor    On savella 50 mg bid  She used to be on gabapentin and she has left arm paresthesias which goes down to the left arm and fingers      Plan     Patient with  Cervical spine issues radiating down to the left arm  Left shoulder pain  Left arm and finger paresthesias  Fibromyalgia with whole body pain  Needs customisation of physical therapy to help with all the above symptoms    MRI C spine    EMG/NCS left arm    - continue savella to 50mg BID   -added gabapentin 100 mg tid to help with paresthesias    Add medrol dose pack      - recommendation for low intensity exercising daily   - stress management    - follow up with PCP as scheduled   - return back to work full time note provided     - education provided on the management of fibromyalgia   - if rash reappears - document and notify me. Referral to dermatology if needed     - recommendation for COVID vaccine when available     rtc in 3 months    No follow-ups on file.                Answers for HPI/ROS submitted by the patient on 2/24/2022  mouth sores: No  trouble swallowing: No  unexpected weight change: Yes  genital sore: No      "

## 2022-02-28 ENCOUNTER — PATIENT MESSAGE (OUTPATIENT)
Dept: RHEUMATOLOGY | Facility: CLINIC | Age: 47
End: 2022-02-28
Payer: MEDICARE

## 2022-03-16 ENCOUNTER — HOSPITAL ENCOUNTER (OUTPATIENT)
Dept: RADIOLOGY | Facility: HOSPITAL | Age: 47
Discharge: HOME | End: 2022-03-16
Attending: INTERNAL MEDICINE
Payer: MEDICARE

## 2022-03-16 DIAGNOSIS — R20.2 ARM PARESTHESIA, LEFT: ICD-10-CM

## 2022-03-16 DIAGNOSIS — M48.9 CERVICAL SPINE DISEASE: ICD-10-CM

## 2022-03-16 PROCEDURE — 72141 MRI CERVICAL SPINE WITHOUT CONTRAST: ICD-10-PCS | Mod: 26,,, | Performed by: RADIOLOGY

## 2022-03-16 PROCEDURE — 72141 MRI NECK SPINE W/O DYE: CPT | Mod: TC

## 2022-03-16 PROCEDURE — 72141 MRI NECK SPINE W/O DYE: CPT | Mod: 26,,, | Performed by: RADIOLOGY

## 2022-03-23 DIAGNOSIS — M48.9 CERVICAL SPINE DISEASE: Primary | ICD-10-CM

## 2022-03-23 DIAGNOSIS — G62.9 NEUROPATHY: ICD-10-CM

## 2022-03-23 DIAGNOSIS — M54.2 CERVICALGIA: ICD-10-CM

## 2022-05-12 ENCOUNTER — PROCEDURE VISIT (OUTPATIENT)
Dept: NEUROLOGY | Facility: CLINIC | Age: 47
End: 2022-05-12
Payer: MEDICARE

## 2022-05-12 DIAGNOSIS — R20.2 ARM PARESTHESIA, LEFT: ICD-10-CM

## 2022-05-12 DIAGNOSIS — M48.9 CERVICAL SPINE DISEASE: ICD-10-CM

## 2022-05-12 PROCEDURE — 95913 PR NERVE CONDUCTION STUDY; 13 OR MORE STUDIES: ICD-10-PCS | Mod: S$GLB,,, | Performed by: PSYCHIATRY & NEUROLOGY

## 2022-05-12 PROCEDURE — 95886 PR EMG COMPLETE, W/ NERVE CONDUCTION STUDIES, 5+ MUSCLES: ICD-10-PCS | Mod: S$GLB,,, | Performed by: PSYCHIATRY & NEUROLOGY

## 2022-05-12 PROCEDURE — 95886 MUSC TEST DONE W/N TEST COMP: CPT | Mod: S$GLB,,, | Performed by: PSYCHIATRY & NEUROLOGY

## 2022-05-12 PROCEDURE — 95913 NRV CNDJ TEST 13/> STUDIES: CPT | Mod: S$GLB,,, | Performed by: PSYCHIATRY & NEUROLOGY

## 2022-08-05 ENCOUNTER — OFFICE VISIT (OUTPATIENT)
Dept: RHEUMATOLOGY | Facility: CLINIC | Age: 47
End: 2022-08-05
Payer: MEDICARE

## 2022-08-05 DIAGNOSIS — M47.812 SPONDYLOSIS OF CERVICAL REGION WITHOUT MYELOPATHY OR RADICULOPATHY: ICD-10-CM

## 2022-08-05 DIAGNOSIS — M47.816 SPONDYLOSIS OF LUMBAR REGION WITHOUT MYELOPATHY OR RADICULOPATHY: ICD-10-CM

## 2022-08-05 DIAGNOSIS — M79.7 FIBROMYALGIA: Primary | ICD-10-CM

## 2022-08-05 PROCEDURE — 1159F PR MEDICATION LIST DOCUMENTED IN MEDICAL RECORD: ICD-10-PCS | Mod: CPTII,95,, | Performed by: INTERNAL MEDICINE

## 2022-08-05 PROCEDURE — 1160F RVW MEDS BY RX/DR IN RCRD: CPT | Mod: CPTII,95,, | Performed by: INTERNAL MEDICINE

## 2022-08-05 PROCEDURE — 99214 OFFICE O/P EST MOD 30 MIN: CPT | Mod: 95,,, | Performed by: INTERNAL MEDICINE

## 2022-08-05 PROCEDURE — 1159F MED LIST DOCD IN RCRD: CPT | Mod: CPTII,95,, | Performed by: INTERNAL MEDICINE

## 2022-08-05 PROCEDURE — 1160F PR REVIEW ALL MEDS BY PRESCRIBER/CLIN PHARMACIST DOCUMENTED: ICD-10-PCS | Mod: CPTII,95,, | Performed by: INTERNAL MEDICINE

## 2022-08-05 PROCEDURE — 99214 PR OFFICE/OUTPT VISIT, EST, LEVL IV, 30-39 MIN: ICD-10-PCS | Mod: 95,,, | Performed by: INTERNAL MEDICINE

## 2022-08-05 RX ORDER — BACLOFEN 10 MG/1
10 TABLET ORAL 2 TIMES DAILY
Qty: 60 TABLET | Refills: 3 | Status: SHIPPED | OUTPATIENT
Start: 2022-08-05 | End: 2022-12-07

## 2022-08-05 RX ORDER — GABAPENTIN 100 MG/1
100 CAPSULE ORAL 3 TIMES DAILY
Qty: 90 CAPSULE | Refills: 3 | Status: SHIPPED | OUTPATIENT
Start: 2022-08-05 | End: 2022-12-07

## 2022-08-05 RX ORDER — ERGOCALCIFEROL 1.25 MG/1
CAPSULE ORAL
Status: CANCELLED | OUTPATIENT
Start: 2022-08-05

## 2022-08-05 RX ORDER — CHLOPHEDIANOL HCL AND PYRILAMINE MALEATE 12.5; 12.5 MG/5ML; MG/5ML
SOLUTION ORAL
COMMUNITY
End: 2022-12-07

## 2022-08-05 RX ORDER — FLUTICASONE PROPIONATE 50 MCG
SPRAY, SUSPENSION (ML) NASAL
COMMUNITY

## 2022-08-05 ASSESSMENT — ROUTINE ASSESSMENT OF PATIENT INDEX DATA (RAPID3)
TOTAL RAPID3 SCORE: 5.67
AM STIFFNESS SCORE: 1, YES
MDHAQ FUNCTION SCORE: 0.6
PATIENT GLOBAL ASSESSMENT SCORE: 7.5
PSYCHOLOGICAL DISTRESS SCORE: 4.4
PAIN SCORE: 7.5
WHEN YOU AWAKENED IN THE MORNING OVER THE LAST WEEK, PLEASE INDICATE THE AMOUNT OF TIME IT TAKES UNTIL YOU ARE AS LIMBER AS YOU WILL BE FOR THE DAY: 2 HOURS
FATIGUE SCORE: 7.5
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
## 2022-08-05 NOTE — PROGRESS NOTES
Rapid3 Question Responses and Scores 8/4/2022   MDHAQ Score 0.6   Psychologic Score 4.4   Pain Score 7.5   When you awakened in the morning OVER THE LAST WEEK, did you feel stiff? Yes   If Yes, please indicate the number of hours until you are as limber as you will be for the day 2   Fatigue Score 7.5   Global Health Score 7.5   RAPID3 Score 5.67         Answers for HPI/ROS submitted by the patient on 8/4/2022  fever: Yes  eye redness: No  mouth sores: No  headaches: No  shortness of breath: No  chest pain: No  trouble swallowing: No  diarrhea: No  constipation: No  unexpected weight change: No  genital sore: No  dysuria: No  During the last 3 days, have you had a skin rash?: No  Bruises or bleeds easily: No  cough: Yes

## 2022-11-08 ENCOUNTER — NURSE TRIAGE (OUTPATIENT)
Dept: ADMINISTRATIVE | Facility: CLINIC | Age: 47
End: 2022-11-08
Payer: MEDICAID

## 2022-11-08 RX ORDER — KETOROLAC TROMETHAMINE 10 MG/1
10 TABLET, FILM COATED ORAL EVERY 6 HOURS
Qty: 20 TABLET | Refills: 0 | Status: SHIPPED | OUTPATIENT
Start: 2022-11-08 | End: 2022-11-13

## 2022-11-08 NOTE — TELEPHONE ENCOUNTER
Reason for Disposition   SEVERE back pain (e.g., excruciating, unable to do any normal activities) and not improved after pain medicine and CARE ADVICE    Additional Information   Negative: Passed out (i.e., fainted, collapsed and was not responding)   Negative: Shock suspected (e.g., cold/pale/clammy skin, too weak to stand, low BP, rapid pulse)   Negative: Sounds like a life-threatening emergency to the triager   Negative: Pain in the upper back over the ribs (rib cage) and worsened by coughing (or clearly increases with breathing)   Negative: Major injury to the back (e.g., MVA, fall > 10 feet or 3 meters, penetrating injury, etc.)   Negative: Pain in the upper back over the ribs (rib cage) that radiates (travels) into the chest   Negative: Back pain during pregnancy   Negative: SEVERE back pain of sudden onset and age > 60 years   Negative: SEVERE abdominal pain (e.g., excruciating)   Negative: Abdominal pain and age > 60 years   Negative: Unable to urinate (or only a few drops) and bladder feels very full   Negative: Loss of bladder or bowel control (urine or bowel incontinence; wetting self, leaking stool) of new-onset   Negative: Numbness (loss of sensation) in groin or rectal area   Negative: Pain radiates into groin, scrotum   Negative: Blood in urine (red, pink, or tea-colored)   Negative: Vomiting and pain over lower ribs of back (i.e., flank - kidney area)   Negative: Weakness of a leg or foot (e.g., unable to bear weight, dragging foot)   Negative: Patient sounds very sick or weak to the triager   Negative: Fever > 100.4 F (38.0 C) and flank pain   Negative: Pain or burning with passing urine (urination)    Protocols used: Back Pain-A-OH      Pt stated she has fibromyalgia. Stated she is trying to reach Dr. Erickson. Pt stated she woke up with pain all over her body, but more in her upper back.    Pt advised per triage protocol to see a Healthcare Provider in the office today. Pt stated she is in  [place] and her PCP does not understand her diagnosis like Dr. Erickson.    Advised a message will be sent to the office nurse /staff to contact her today. Instructed to call OOC back for new or worsening symptoms.    Pt verbalized understanding.

## 2022-11-09 ENCOUNTER — NURSE TRIAGE (OUTPATIENT)
Dept: ADMINISTRATIVE | Facility: CLINIC | Age: 47
End: 2022-11-09
Payer: MEDICAID

## 2022-11-09 NOTE — TELEPHONE ENCOUNTER
Spoke with patient states she has fibromyalgia.  Patient states she is having severe back pain.  Pain started yesterday and she spoke with Dr. Erickson who ordered Toradol.  Patient states medication has not helped symptoms.  Patient reports that she also has pain in both calves and it is painful to walk.  Patient states when she is walking she feels a pulling sensation.  Patient went to work today which worsened her symptoms.  Advised patient to go to ER for further evaluation and have another adult drive.  Patient verbalized understanding.     Reason for Disposition   Weakness of a leg or foot (e.g., unable to bear weight, dragging foot)   Unable to walk    Additional Information   Negative: Passed out (i.e., lost consciousness, collapsed and was not responding)   Negative: Shock suspected (e.g., cold/pale/clammy skin, too weak to stand, low BP, rapid pulse)   Negative: Sounds like a life-threatening emergency to the triager   Negative: [1] SEVERE back pain (e.g., excruciating) AND [2] sudden onset AND [3] age > 60 years   Negative: [1] Unable to urinate (or only a few drops) > 4 hours AND [2] bladder feels very full (e.g., palpable bladder or strong urge to urinate)   Negative: [1] Loss of bladder or bowel control (urine or bowel incontinence; wetting self, leaking stool) AND [2] new-onset   Negative: Numbness in groin or rectal area (i.e., loss of sensation)   Negative: [1] SEVERE abdominal pain AND [2] present > 1 hour   Negative: [1] Abdominal pain AND [2] age > 60 years   Negative: Looks like a broken bone or dislocated joint (e.g., crooked or deformed)   Negative: Sounds like a life-threatening emergency to the triager   Negative: Chest pain   Negative: Difficulty breathing   Negative: Entire foot is cool or blue in comparison to other side    Protocols used: Back Pain-A-AH, Leg Pain-A-AH

## 2022-11-10 ENCOUNTER — PATIENT MESSAGE (OUTPATIENT)
Dept: RHEUMATOLOGY | Facility: CLINIC | Age: 47
End: 2022-11-10
Payer: MEDICAID

## 2022-11-15 DIAGNOSIS — M47.816 SPONDYLOSIS OF LUMBAR REGION WITHOUT MYELOPATHY OR RADICULOPATHY: ICD-10-CM

## 2022-11-15 DIAGNOSIS — M79.7 FIBROMYALGIA: Primary | ICD-10-CM

## 2022-11-15 DIAGNOSIS — M47.812 SPONDYLOSIS OF CERVICAL REGION WITHOUT MYELOPATHY OR RADICULOPATHY: ICD-10-CM

## 2022-11-22 ENCOUNTER — PATIENT MESSAGE (OUTPATIENT)
Dept: RHEUMATOLOGY | Facility: CLINIC | Age: 47
End: 2022-11-22
Payer: MEDICAID

## 2022-11-30 ENCOUNTER — PATIENT MESSAGE (OUTPATIENT)
Dept: RHEUMATOLOGY | Facility: CLINIC | Age: 47
End: 2022-11-30
Payer: MEDICAID

## 2022-12-07 ENCOUNTER — PATIENT MESSAGE (OUTPATIENT)
Dept: RHEUMATOLOGY | Facility: CLINIC | Age: 47
End: 2022-12-07

## 2022-12-07 ENCOUNTER — OFFICE VISIT (OUTPATIENT)
Dept: RHEUMATOLOGY | Facility: CLINIC | Age: 47
End: 2022-12-07
Payer: MEDICARE

## 2022-12-07 VITALS
BODY MASS INDEX: 30.9 KG/M2 | HEART RATE: 97 BPM | DIASTOLIC BLOOD PRESSURE: 91 MMHG | WEIGHT: 192.25 LBS | SYSTOLIC BLOOD PRESSURE: 147 MMHG | HEIGHT: 66 IN

## 2022-12-07 DIAGNOSIS — M47.812 SPONDYLOSIS OF CERVICAL REGION WITHOUT MYELOPATHY OR RADICULOPATHY: ICD-10-CM

## 2022-12-07 DIAGNOSIS — M47.816 SPONDYLOSIS OF LUMBAR REGION WITHOUT MYELOPATHY OR RADICULOPATHY: ICD-10-CM

## 2022-12-07 DIAGNOSIS — M79.7 FIBROMYALGIA: Primary | ICD-10-CM

## 2022-12-07 PROCEDURE — 1159F PR MEDICATION LIST DOCUMENTED IN MEDICAL RECORD: ICD-10-PCS | Mod: CPTII,S$GLB,, | Performed by: INTERNAL MEDICINE

## 2022-12-07 PROCEDURE — 3080F PR MOST RECENT DIASTOLIC BLOOD PRESSURE >= 90 MM HG: ICD-10-PCS | Mod: CPTII,S$GLB,, | Performed by: INTERNAL MEDICINE

## 2022-12-07 PROCEDURE — 99214 OFFICE O/P EST MOD 30 MIN: CPT | Mod: S$GLB,,, | Performed by: INTERNAL MEDICINE

## 2022-12-07 PROCEDURE — 3077F PR MOST RECENT SYSTOLIC BLOOD PRESSURE >= 140 MM HG: ICD-10-PCS | Mod: CPTII,S$GLB,, | Performed by: INTERNAL MEDICINE

## 2022-12-07 PROCEDURE — 99999 PR PBB SHADOW E&M-EST. PATIENT-LVL III: CPT | Mod: PBBFAC,,, | Performed by: INTERNAL MEDICINE

## 2022-12-07 PROCEDURE — 99214 PR OFFICE/OUTPT VISIT, EST, LEVL IV, 30-39 MIN: ICD-10-PCS | Mod: S$GLB,,, | Performed by: INTERNAL MEDICINE

## 2022-12-07 PROCEDURE — 3008F PR BODY MASS INDEX (BMI) DOCUMENTED: ICD-10-PCS | Mod: CPTII,S$GLB,, | Performed by: INTERNAL MEDICINE

## 2022-12-07 PROCEDURE — 1159F MED LIST DOCD IN RCRD: CPT | Mod: CPTII,S$GLB,, | Performed by: INTERNAL MEDICINE

## 2022-12-07 PROCEDURE — 3077F SYST BP >= 140 MM HG: CPT | Mod: CPTII,S$GLB,, | Performed by: INTERNAL MEDICINE

## 2022-12-07 PROCEDURE — 1160F RVW MEDS BY RX/DR IN RCRD: CPT | Mod: CPTII,S$GLB,, | Performed by: INTERNAL MEDICINE

## 2022-12-07 PROCEDURE — 1160F PR REVIEW ALL MEDS BY PRESCRIBER/CLIN PHARMACIST DOCUMENTED: ICD-10-PCS | Mod: CPTII,S$GLB,, | Performed by: INTERNAL MEDICINE

## 2022-12-07 PROCEDURE — 3008F BODY MASS INDEX DOCD: CPT | Mod: CPTII,S$GLB,, | Performed by: INTERNAL MEDICINE

## 2022-12-07 PROCEDURE — 99999 PR PBB SHADOW E&M-EST. PATIENT-LVL III: ICD-10-PCS | Mod: PBBFAC,,, | Performed by: INTERNAL MEDICINE

## 2022-12-07 PROCEDURE — 3080F DIAST BP >= 90 MM HG: CPT | Mod: CPTII,S$GLB,, | Performed by: INTERNAL MEDICINE

## 2022-12-07 RX ORDER — METHOCARBAMOL 500 MG/1
TABLET, FILM COATED ORAL
Qty: 60 TABLET | Refills: 5 | Status: SHIPPED | OUTPATIENT
Start: 2022-12-07 | End: 2023-06-08 | Stop reason: SDUPTHER

## 2022-12-07 RX ORDER — GABAPENTIN 300 MG/1
CAPSULE ORAL
Qty: 90 CAPSULE | Refills: 5 | Status: SHIPPED | OUTPATIENT
Start: 2022-12-07

## 2022-12-07 ASSESSMENT — ROUTINE ASSESSMENT OF PATIENT INDEX DATA (RAPID3)
TOTAL RAPID3 SCORE: 7.61
FATIGUE SCORE: 10
AM STIFFNESS SCORE: 1, YES
PAIN SCORE: 10
PATIENT GLOBAL ASSESSMENT SCORE: 8.5
PSYCHOLOGICAL DISTRESS SCORE: 4.4
MDHAQ FUNCTION SCORE: 1.3
WHEN YOU AWAKENED IN THE MORNING OVER THE LAST WEEK, PLEASE INDICATE THE AMOUNT OF TIME IT TAKES UNTIL YOU ARE AS LIMBER AS YOU WILL BE FOR THE DAY: 3

## 2022-12-07 NOTE — PROGRESS NOTES
Rapid3 Question Responses and Scores 11/30/2022   MDHAQ Score 1.3   Psychologic Score 4.4   Pain Score 10   When you awakened in the morning OVER THE LAST WEEK, did you feel stiff? Yes   If Yes, please indicate the number of hours until you are as limber as you will be for the day 3   Fatigue Score 10   Global Health Score 8.5   RAPID3 Score 7.61     Answers submitted by the patient for this visit:  Rheumatology Questionnaire (Submitted on 11/30/2022)  fever: No  eye redness: No  mouth sores: No  headaches: No  shortness of breath: No  chest pain: No  trouble swallowing: No  diarrhea: No  constipation: No  unexpected weight change: No  genital sore: No  dysuria: No  During the last 3 days, have you had a skin rash?: No  Bruises or bleeds easily: No  cough: No
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
## 2022-12-08 ENCOUNTER — TELEPHONE (OUTPATIENT)
Dept: RHEUMATOLOGY | Facility: CLINIC | Age: 47
End: 2022-12-08

## 2022-12-29 ENCOUNTER — PATIENT MESSAGE (OUTPATIENT)
Dept: RHEUMATOLOGY | Facility: CLINIC | Age: 47
End: 2022-12-29
Payer: MEDICAID

## 2023-01-10 ENCOUNTER — PATIENT MESSAGE (OUTPATIENT)
Dept: RHEUMATOLOGY | Facility: CLINIC | Age: 48
End: 2023-01-10
Payer: MEDICAID

## 2023-01-17 ENCOUNTER — PATIENT MESSAGE (OUTPATIENT)
Dept: RHEUMATOLOGY | Facility: CLINIC | Age: 48
End: 2023-01-17
Payer: MEDICAID

## 2023-03-28 ENCOUNTER — PATIENT MESSAGE (OUTPATIENT)
Dept: RHEUMATOLOGY | Facility: CLINIC | Age: 48
End: 2023-03-28
Payer: MEDICARE

## 2023-03-31 ENCOUNTER — TELEPHONE (OUTPATIENT)
Dept: RHEUMATOLOGY | Facility: CLINIC | Age: 48
End: 2023-03-31
Payer: MEDICARE

## 2023-04-13 ENCOUNTER — PATIENT MESSAGE (OUTPATIENT)
Dept: RHEUMATOLOGY | Facility: CLINIC | Age: 48
End: 2023-04-13
Payer: MEDICARE

## 2023-04-14 DIAGNOSIS — M47.812 SPONDYLOSIS OF CERVICAL REGION WITHOUT MYELOPATHY OR RADICULOPATHY: Primary | ICD-10-CM

## 2023-04-19 ENCOUNTER — TELEPHONE (OUTPATIENT)
Dept: RHEUMATOLOGY | Facility: CLINIC | Age: 48
End: 2023-04-19
Payer: MEDICARE

## 2023-04-19 NOTE — TELEPHONE ENCOUNTER
----- Message from Amber Mckee RN sent at 4/18/2023  6:47 PM CDT -----    ----- Message -----  From: Kristina Knox  Sent: 4/18/2023   4:40 PM CDT  To: Georgina Collins Staff    Lisy schneider/ [facility] calling regarding the TENS UNIT that was ordered from them . she stating that the PT live outside of their location and the order will need to be sent to another DME facility near the PT, call back if needed [phone]

## 2023-04-24 ENCOUNTER — PATIENT MESSAGE (OUTPATIENT)
Dept: RHEUMATOLOGY | Facility: CLINIC | Age: 48
End: 2023-04-24
Payer: MEDICARE

## 2023-06-08 NOTE — TELEPHONE ENCOUNTER
----- Message from Modesto Taylor sent at 2023 12:16 PM CDT -----  Who Called:pharmacy       What is the reqeust in detail:is requesting the following to be sent to [facility] for refills . Please advise     hydroCHLOROthiazide (HYDRODIURIL) 25 MG tablet   2016  --  Sig: Once daily  Class: Historical Med  Order: [id number]    milnacipran (SAVELLA) 50 mg Tab 60 tablet 5 2023 No  Sig - Route: Take 1 tablet (50 mg total) by mouth 2 (two) times daily. - Oral  Sent to pharmacy as: milnacipran (SAVELLA) 50 mg Tab  Class: Normal    methocarbamoL (ROBAXIN) 500 MG Tab 60 tablet 5 2022  No  Si mg bed time and one additional only if needed day time  Sent to pharmacy as: methocarbamoL (ROBAXIN) 500 MG Tab  Class: Normal    [facility] - [facility] [address] [facility] [address]  [address] [facility] [address]  Phone: [phone] Fax: [phone]  Hours: Not open 24 hours          Can the clinic reply by [facility]? No       Best Call Back Number:[phone]      Additional Information:

## 2023-06-09 RX ORDER — METHOCARBAMOL 500 MG/1
TABLET, FILM COATED ORAL
Qty: 60 TABLET | Refills: 5 | Status: SHIPPED | OUTPATIENT
Start: 2023-06-09

## 2023-06-09 RX ORDER — HYDROCHLOROTHIAZIDE 25 MG/1
TABLET ORAL
Qty: 90 TABLET | Refills: 1 | Status: SHIPPED | OUTPATIENT
Start: 2023-06-09 | End: 2023-11-04

## 2023-09-11 ENCOUNTER — PATIENT MESSAGE (OUTPATIENT)
Dept: RHEUMATOLOGY | Facility: CLINIC | Age: 48
End: 2023-09-11
Payer: MEDICARE

## 2023-09-12 ENCOUNTER — OFFICE VISIT (OUTPATIENT)
Dept: RHEUMATOLOGY | Facility: CLINIC | Age: 48
End: 2023-09-12
Payer: MEDICARE

## 2023-09-12 DIAGNOSIS — M79.7 FIBROMYALGIA: ICD-10-CM

## 2023-09-12 DIAGNOSIS — M47.812 SPONDYLOSIS OF CERVICAL REGION WITHOUT MYELOPATHY OR RADICULOPATHY: Primary | ICD-10-CM

## 2023-09-12 PROCEDURE — 99214 PR OFFICE/OUTPT VISIT, EST, LEVL IV, 30-39 MIN: ICD-10-PCS | Mod: GT,,, | Performed by: INTERNAL MEDICINE

## 2023-09-12 PROCEDURE — 99214 OFFICE O/P EST MOD 30 MIN: CPT | Mod: GT,,, | Performed by: INTERNAL MEDICINE
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
## 2023-09-21 ENCOUNTER — HOSPITAL ENCOUNTER (OUTPATIENT)
Dept: RADIOLOGY | Facility: HOSPITAL | Age: 48
Discharge: HOME | End: 2023-09-21
Attending: INTERNAL MEDICINE
Payer: MEDICARE

## 2023-09-21 DIAGNOSIS — M47.812 SPONDYLOSIS OF CERVICAL REGION WITHOUT MYELOPATHY OR RADICULOPATHY: ICD-10-CM

## 2023-09-21 PROCEDURE — 72141 MRI NECK SPINE W/O DYE: CPT | Mod: TC

## 2023-09-21 PROCEDURE — 72141 MRI CERVICAL SPINE WITHOUT CONTRAST: ICD-10-PCS | Mod: 26,,, | Performed by: RADIOLOGY

## 2023-09-21 PROCEDURE — 72141 MRI NECK SPINE W/O DYE: CPT | Mod: 26,,, | Performed by: RADIOLOGY

## 2023-10-18 ENCOUNTER — PATIENT MESSAGE (OUTPATIENT)
Dept: RHEUMATOLOGY | Facility: CLINIC | Age: 48
End: 2023-10-18
Payer: MEDICARE

## 2023-10-23 DIAGNOSIS — M47.812 SPONDYLOSIS OF CERVICAL REGION WITHOUT MYELOPATHY OR RADICULOPATHY: Primary | ICD-10-CM

## 2023-10-26 ENCOUNTER — PATIENT MESSAGE (OUTPATIENT)
Dept: RHEUMATOLOGY | Facility: CLINIC | Age: 48
End: 2023-10-26
Payer: MEDICARE

## 2023-11-04 RX ORDER — HYDROCHLOROTHIAZIDE 25 MG/1
TABLET ORAL
Qty: 90 TABLET | Refills: 10 | Status: SHIPPED | OUTPATIENT
Start: 2023-11-04

## 2023-11-24 ENCOUNTER — HOSPITAL ENCOUNTER (OUTPATIENT)
Dept: TELEMEDICINE | Facility: HOSPITAL | Age: 48
Discharge: HOME | End: 2023-11-24
Payer: MEDICARE

## 2023-11-24 PROCEDURE — G0427 INPT/ED TELECONSULT70: HCPCS | Mod: 95,,, | Performed by: PSYCHIATRY & NEUROLOGY

## 2023-11-24 PROCEDURE — G0427 PR INPT TELEHEALTH CON 70/>M: ICD-10-PCS | Mod: 95,,, | Performed by: PSYCHIATRY & NEUROLOGY

## 2023-11-25 NOTE — TELEMEDICINE CONSULT
[facility]  Vascular Neurology  Comprehensive Stroke Center  TeleVascular Neurology Acute Consultation Note        Consult Information  Consults    Consulting Provider: CORINNE JOHNSON   Current Providers  No providers found    Patient [facility]    [facility] hospital nurse at bedside with patient assisting consultant.  Patient information was obtained from patient.       Stroke Documentation  Acute Stroke Times   Last Known Normal Date: 11/24/23  Last Known Normal Time: 2050  Stroke Team Called Date: 11/24/23  Stroke Team Called Time: 2204  Stroke Team Arrival Date: 11/24/23  Stroke Team Arrival Time: 2204  Thrombolytic Therapy Recommended: Yes    NIH Scale:  1a. Level of Consciousness: 0-->Alert, keenly responsive  1b. LOC Questions: 0-->Answers both questions correctly  1c. LOC Commands: 0-->Performs both tasks correctly  2. Best Gaze: 0-->Normal  3. Visual: 0-->No visual loss  4. Facial Palsy: 0-->Normal symmetrical movements  5a. Motor Arm, Left: 1-->Drift, limb holds 90 (or 45) degrees, but drifts down before full 10 seconds, does not hit bed or other support  5b. Motor Arm, Right: 0-->No drift, limb holds 90 (or 45) degrees for full 10 secs  6a. Motor Leg, Left: 1-->Drift, leg falls by the end of the 5-sec period but does not hit bed  6b. Motor Leg, Right: 0-->No drift, leg holds 30 degree position for full 5 secs  7. Limb Ataxia: 0-->Absent  8. Sensory: 1-->Mild-to-moderate sensory loss, patient feels pinprick is less sharp or is dull on the affected side, or there is a loss of superficial pain with pinprick, but patient is aware of being touched  9. Best Language: 0-->No aphasia, normal  10. Dysarthria: 0-->Normal  11. Extinction and Inattention (formerly Neglect): 0-->No abnormality  Total (NIH Stroke Scale): 3      Modified Weld:    Vicenta Coma Scale:     ABCD2 Score:    [id number]  Score:    HAS -BLED Score:    ICH Score:    Hunt & Lundberg Classification:      There were no vitals taken for this visit.    Diagnoses  No problems updated.    Medical Decision Making  HPI:  48 y.o. female -Referring Facility and Unit [facility] - TELEMEDICINE ED  Referring Physician CORINNE ALMANZAR  Last Known Well Date TODAY 11/24  Last Known Well Time 2050  Symptoms ENTIRE LEFT SIDED WEAKNESS AND NUMBNESS   Unilateral Weakness (yes or no) Yes  If Unilateral Weakness is yes, is there Aphasia, Confusion, Neglect, Visual Deficit no  Van Positive or Van Negative: Negative      Images personally reviewed and interpreted:  Study: Head CT  Study Interpretation: No acute intracranial process,-     Assessment and plan:  CT head with no acute intra-cranial process. Patient is candidate of TNK given her symptoms consistent with acute ischemic stroke.   Recommend IV Tenecteplase 0.25mg/kg IV push (max dose 25mg); If Tenecteplase is not available use Alteplase 0.9mg/kg IV bolus followed by infusion (max dose 90mg)      Additional Recommendations:   1. Neurological assessment and vital signs (except temperature) every 15 minutes x 2 hours, then every 30 minutes x 6 hours, then every hour x 16 hours..  2. Frequency of BP assessments may need to be increased if systolic BP stays >= 180 mm Hg or diastolic BP stays >= 105 mm Hg. Administer antihypertensive meds as ordered  3. Temperature every 4 hours or as required.  4. Follow hospital protocol for further orders re: post thrombolytic therapy patient management.  5. No antithrombotics or anticoagulants (including but not limited to: heparin, warfarin, aspirin, clopidogrel, or dipyridamole) for 24 hours, then start antithrombotics as ordered by treating physician     I also recommend obtaining CTA head and neck with and without contrast. They will call me with CTA results if abnormal. If LVO, patient will be transferred for thrombectomy. Otherwise,  below is plan.     - Admit to MICU for 24 hour monitoring s/p IV TNK   - Please use IV TPA order set  - Keep Strict NPO until patient passes bedside SS  - Head of bed flat, IV Fluids  - BP: Aggressive Treatment to keep blood pressure less than 180/105 mmHg, using: labetalol or nicardipine, avoid hydralazine/nitrates  - Emergent CT Brain for: acute neurologic decline, nausea, vomiting, or new headache  - Repeat Imaging, ideally MRI Brain in 24 hours post treatment, but if unable to obtain MRI Brain, please get repeat non-con CT Brain  - LDL, HgA1C  - Neuro checks, vital checks  - Neuro consult if in house available or transfer for neuro consult  - Stroke/TIA work-up with MRI brain, Echo, PT/OT/ Speech and swallow evaluation.      Lytics recommendation: Recommend IV Tenecteplase 0.25mg/kg IV push (max dose 25mg); If Tenecteplase is not available use Alteplase 0.9mg/kg IV bolus followed by infusion (max dose 90mg)     Additional Recommendations:   Neurological assessment and vital signs (except temperature) every 15 minutes x 2 hours, then every 30 minutes x 6 hours, then every hour x 16 hours..  Frequency of BP assessments may need to be increased if systolic BP stays >= 180 mm Hg or diastolic BP stays >= 105 mm Hg. Administer antihypertensive meds as ordered  Temperature every 4 hours or as required.  Follow hospital protocol for further orders re: post thrombolytic therapy patient management.  No antithrombotics or anticoagulants (including but not limited to: heparin, warfarin, aspirin, clopidigrel, or dipyridamole) for 24 hours, then start antithrombotics as ordered by treating physician    Adapted from the American Heart Association/American Stroke Association (AHA/ASA) and American Association of Neuroscience Nurses (AANN) Guidelines.   Thrombectomy recommendation: Awaiting CTA results from [facility] for determination   Placement recommendation: transfer to nearest appropriate facility                ROS  Physical  Exam  Past Medical History:   Diagnosis Date    Asthma 2/9/2019    Fibromyalgia 2/9/2019    H/O tubal ligation 2/9/2019    Multiple sclerosis      No past surgical history on file.  No family history on file.        Desean Downing MD      Emergent/Acute neurological consultation requested by spoke provider due to critical concerns for possible cerebrovascular event that could result in permanent loss of neurologic/bodily function, severe disability or death of this patient.  Immediate/timely evaluation by a highly prepared expert is paramount for optimal outcomes  High risk for neurological deterioration if not properly diagnosed  High risk for neurological deterioration if not treated promplty/as soon as possible  Complex diagnostic evaluation may be required (advanced imaging)  High risk treatment options (thrombolytics and/or thrombectomy)    Patient care was coordinated with spoke provider, including but not limted to    Discussing likely diagnosis/etiology of symptoms  Making recommendations for further diagnostic studies  Making recommendations for intravenous thrombolytics or other advanced therapies  Making recommendations for disposition (admission/transfer for higher level of care)

## 2023-11-25 NOTE — SUBJECTIVE & OBJECTIVE
HPI:  48 y.o. female -Referring Facility and Unit [facility] - TELEMEDICINE ED  Referring Physician CORINNE ALMANZAR  Last Known Well Date TODAY 11/24  Last Known Well Time 2050  Symptoms ENTIRE LEFT SIDED WEAKNESS AND NUMBNESS   Unilateral Weakness (yes or no) Yes  If Unilateral Weakness is yes, is there Aphasia, Confusion, Neglect, Visual Deficit no  Van Positive or Van Negative: Negative      Images personally reviewed and interpreted:  Study: Head CT  Study Interpretation: No acute intracranial process,-     Assessment and plan:  CT head with no acute intra-cranial process. Patient is candidate of TNK given her symptoms consistent with acute ischemic stroke.   Recommend IV Tenecteplase 0.25mg/kg IV push (max dose 25mg); If Tenecteplase is not available use Alteplase 0.9mg/kg IV bolus followed by infusion (max dose 90mg)      Additional Recommendations:   1. Neurological assessment and vital signs (except temperature) every 15 minutes x 2 hours, then every 30 minutes x 6 hours, then every hour x 16 hours..  2. Frequency of BP assessments may need to be increased if systolic BP stays >= 180 mm Hg or diastolic BP stays >= 105 mm Hg. Administer antihypertensive meds as ordered  3. Temperature every 4 hours or as required.  4. Follow hospital protocol for further orders re: post thrombolytic therapy patient management.  5. No antithrombotics or anticoagulants (including but not limited to: heparin, warfarin, aspirin, clopidogrel, or dipyridamole) for 24 hours, then start antithrombotics as ordered by treating physician     I also recommend obtaining CTA head and neck with and without contrast. They will call me with CTA results if abnormal. If LVO, patient will be transferred for thrombectomy. Otherwise, below is plan.     - Admit to MICU for 24 hour monitoring s/p IV TNK   - Please use IV TPA order set  - Keep Strict NPO until patient passes bedside SS  - Head of bed flat, IV Fluids  -  BP: Aggressive Treatment to keep blood pressure less than 180/105 mmHg, using: labetalol or nicardipine, avoid hydralazine/nitrates  - Emergent CT Brain for: acute neurologic decline, nausea, vomiting, or new headache  - Repeat Imaging, ideally MRI Brain in 24 hours post treatment, but if unable to obtain MRI Brain, please get repeat non-con CT Brain  - LDL, HgA1C  - Neuro checks, vital checks  - Neuro consult if in house available or transfer for neuro consult  - Stroke/TIA work-up with MRI brain, Echo, PT/OT/ Speech and swallow evaluation.      Lytics recommendation: Recommend IV Tenecteplase 0.25mg/kg IV push (max dose 25mg); If Tenecteplase is not available use Alteplase 0.9mg/kg IV bolus followed by infusion (max dose 90mg)     Additional Recommendations:   Neurological assessment and vital signs (except temperature) every 15 minutes x 2 hours, then every 30 minutes x 6 hours, then every hour x 16 hours..  Frequency of BP assessments may need to be increased if systolic BP stays >= 180 mm Hg or diastolic BP stays >= 105 mm Hg. Administer antihypertensive meds as ordered  Temperature every 4 hours or as required.  Follow hospital protocol for further orders re: post thrombolytic therapy patient management.  No antithrombotics or anticoagulants (including but not limited to: heparin, warfarin, aspirin, clopidigrel, or dipyridamole) for 24 hours, then start antithrombotics as ordered by treating physician    Adapted from the American Heart Association/American Stroke Association (AHA/ASA) and [employer] Neuro[employer] Nurses (AANN) Guidelines.   Thrombectomy recommendation: Awaiting CTA results from [facility] for determination   Placement recommendation: transfer to nearest appropriate facility

## 2024-06-03 ENCOUNTER — PATIENT MESSAGE (OUTPATIENT)
Dept: RHEUMATOLOGY | Facility: CLINIC | Age: 49
End: 2024-06-03
Payer: MEDICARE

## 2024-07-25 ENCOUNTER — PATIENT MESSAGE (OUTPATIENT)
Dept: RHEUMATOLOGY | Facility: CLINIC | Age: 49
End: 2024-07-25
Payer: MEDICARE

## 2024-07-25 DIAGNOSIS — M79.7 FIBROMYALGIA: Primary | ICD-10-CM

## 2024-07-25 DIAGNOSIS — M47.812 SPONDYLOSIS OF CERVICAL REGION WITHOUT MYELOPATHY OR RADICULOPATHY: ICD-10-CM

## 2024-07-25 DIAGNOSIS — M47.816 SPONDYLOSIS OF LUMBAR REGION WITHOUT MYELOPATHY OR RADICULOPATHY: ICD-10-CM
